# Patient Record
Sex: FEMALE | Race: WHITE | HISPANIC OR LATINO | Employment: OTHER | URBAN - METROPOLITAN AREA
[De-identification: names, ages, dates, MRNs, and addresses within clinical notes are randomized per-mention and may not be internally consistent; named-entity substitution may affect disease eponyms.]

---

## 2021-01-21 ENCOUNTER — TELEPHONE (OUTPATIENT)
Dept: SCHEDULING | Facility: IMAGING CENTER | Age: 66
End: 2021-01-21

## 2021-01-29 ENCOUNTER — TELEMEDICINE (OUTPATIENT)
Dept: MEDICAL GROUP | Facility: IMAGING CENTER | Age: 66
End: 2021-01-29
Payer: COMMERCIAL

## 2021-01-29 VITALS
DIASTOLIC BLOOD PRESSURE: 91 MMHG | SYSTOLIC BLOOD PRESSURE: 149 MMHG | BODY MASS INDEX: 19.29 KG/M2 | WEIGHT: 113 LBS | TEMPERATURE: 99.4 F | OXYGEN SATURATION: 91 % | HEIGHT: 64 IN | HEART RATE: 96 BPM

## 2021-01-29 DIAGNOSIS — Z12.11 SCREEN FOR COLON CANCER: ICD-10-CM

## 2021-01-29 DIAGNOSIS — K64.4 EXTERNAL HEMORRHOID: ICD-10-CM

## 2021-01-29 DIAGNOSIS — I10 ESSENTIAL HYPERTENSION: ICD-10-CM

## 2021-01-29 DIAGNOSIS — R06.02 SOB (SHORTNESS OF BREATH) ON EXERTION: ICD-10-CM

## 2021-01-29 DIAGNOSIS — G47.33 OBSTRUCTIVE SLEEP APNEA SYNDROME: ICD-10-CM

## 2021-01-29 DIAGNOSIS — M81.0 OSTEOPOROSIS OF LUMBAR SPINE: ICD-10-CM

## 2021-01-29 DIAGNOSIS — K21.9 GASTROESOPHAGEAL REFLUX DISEASE, UNSPECIFIED WHETHER ESOPHAGITIS PRESENT: ICD-10-CM

## 2021-01-29 DIAGNOSIS — Z86.010 HISTORY OF COLON POLYPS: ICD-10-CM

## 2021-01-29 DIAGNOSIS — Z13.220 SCREENING, LIPID: ICD-10-CM

## 2021-01-29 DIAGNOSIS — Z12.31 ENCOUNTER FOR SCREENING MAMMOGRAM FOR MALIGNANT NEOPLASM OF BREAST: ICD-10-CM

## 2021-01-29 DIAGNOSIS — Z11.59 NEED FOR HEPATITIS C SCREENING TEST: ICD-10-CM

## 2021-01-29 PROBLEM — Z86.0100 HISTORY OF COLON POLYPS: Status: ACTIVE | Noted: 2021-01-29

## 2021-01-29 PROCEDURE — 99204 OFFICE O/P NEW MOD 45 MIN: CPT | Mod: 95,CR | Performed by: FAMILY MEDICINE

## 2021-01-29 RX ORDER — LISINOPRIL 10 MG/1
10 TABLET ORAL DAILY
Qty: 90 TAB | Refills: 0 | Status: SHIPPED | OUTPATIENT
Start: 2021-01-29 | End: 2021-04-22 | Stop reason: SDUPTHER

## 2021-01-29 RX ORDER — CHLORAL HYDRATE 500 MG
1000 CAPSULE ORAL 2 TIMES DAILY
COMMUNITY

## 2021-01-29 RX ORDER — LISINOPRIL 10 MG/1
10 TABLET ORAL DAILY
COMMUNITY
End: 2021-01-29 | Stop reason: SDUPTHER

## 2021-01-29 ASSESSMENT — PAIN SCALES - GENERAL: PAINLEVEL: NO PAIN

## 2021-01-29 ASSESSMENT — PATIENT HEALTH QUESTIONNAIRE - PHQ9: CLINICAL INTERPRETATION OF PHQ2 SCORE: 0

## 2021-01-29 NOTE — PATIENT INSTRUCTIONS
Trent Hollis, nice to meet you today!  Please plan to fast 8-10 hours for your lab work. Thank you!

## 2021-01-29 NOTE — PROGRESS NOTES
Telemedicine: New Patient   This evaluation was conducted via Zoom using secure and encrypted videoconferencing technology. The patient was in a private location in the state of [place].    The patient's identity was confirmed and verbal consent was obtained for this virtual visit.    Subjective:     CC:   Chief Complaint   Patient presents with   • Hypertension Follow-up     Rx refill   • Referral Needed     due for colonoscopy, issues w/ digestion   • Establish Care     [facility] womens well exam   • Shortness of Breath     has sleep apnea and cpap machine       Telma Vergara is a 65 y.o. female new patient presenting to establish care and to discuss the evaluation and management of:  humana- change in PCP.     Hypertension- does not us machine all the time 130-140s systolic.   Lisinopril 10 mg, since 2017. Tolerated fine. No cough symptoms.   Last lab work July 2020, she will send.     Hx of colon polyp. 2012- last one completed per patient.  States she is due. Moved from [place] to here- since 2017, this has not completed in between.  Some reflux with certain foods.     Sleep apnea, obstructive-on cpap since 2013.   No pulmonary specialist she is seen currently.  2017-last had checked.  Moved to [facility] in 2017 to a higher elevation and has since adjusted.  Sleeps overall well.  No issues currently with her machine.    Occasional with exercising, notices a little shortness of breath. Last month or so with symptoms more so.   Different than needing to take a deep breath.   Never smoker, no second hand smoke exposure.  No wheezing. Sometimes with lying down.   No allergies. Sometimes reflux with certain foods.   No chest pain or pressure.  No cough symptoms.  Sometimes with more exertional exercise, occurs occasionally but not consistently.  O2 sat- usually 88-89%, at rest, as reviewed with prior PCP.  Last 2 years has noticed changes.  States she is not concerned about this but her  had noted that this is not a  normal range.  States his oxygen levels are better.      Healthcare maintenance:  Pap: Will schedule gyn exam in near future at our office.   Feb 2020- negative pap. Prior pap with + hpv.   Colonoscopy-believes last one was 2012.  History of colon polyps.  States she is due.    Dexa- 2015, prior to it on medication for bone loss. 5 years. Possibly improved.    Immunizations: We will need from prior records.  Reviewed recommendations for shingles vaccine and pneumonia vaccine.      Health Maintenance Summary                HEPATITIS C SCREENING Overdue 1955     PAP SMEAR Overdue 12/28/1976     MAMMOGRAM Overdue 12/28/1995     COLONOSCOPY Overdue 12/28/2005     IMM ZOSTER VACCINES Overdue 12/28/2005     BONE DENSITY Overdue 12/28/2020     IMM PNEUMOCOCCAL VACCINE: 65+ Years Overdue 12/28/2020     IMM DTaP/Tdap/Td Vaccine Next Due 2/22/2030      Done 2/22/2020 Ext Imm: Tdap        There is no immunization history on file for this patient.    Lifestyle:  Diet: cooks all meals. Not a lot of red meat. Mostly fish, vegetables, overall healthy. Yogurt. 5 small meal.   Exercise daily.   Stress: mostly work related, exercise.   Lives with her -has adult children.  Work: from home, . Has grown.       ROS  Review of Systems   Constitutional: Negative for fever, chills and malaise/fatigue.   HENT: Negative for congestion.    Eyes: Negative for pain or visual.   Respiratory: Negative for cough.   Cardiovascular: Negative for leg swelling.   Gastrointestinal: Negative for nausea, vomiting, abdominal pain and diarrhea.   Genitourinary: Negative for dysuria and hematuria.   Skin: Negative for rash.   Neurological: Negative for dizziness, focal weakness and headaches.   Endo/Heme/Allergies: Does not bruise/bleed easily.   Psychiatric/Behavioral: Negative for depression.  The patient is not nervous/anxious.        No Known Allergies    Current medicines (including changes today)  Current Outpatient  Medications   Medication Sig Dispense Refill   • VITAMIN D PO Take  by mouth.     • Omega-3 Fatty Acids (FISH OIL) 1000 MG Cap capsule Take 1,000 mg by mouth 3 times a day, with meals.     • Probiotic Product (PROBIOTIC-10 PO) Take  by mouth.     • lisinopril (PRINIVIL) 10 MG Tab Take 1 Tab by mouth every day. 90 Tab 0     No current facility-administered medications for this visit.        She  has a past medical history of Hypertension and Sleep apnea (01/01/2012).  She  has a past surgical history that includes cholecystectomy (2013); bunionectomy (Right, 2015); eye surgery (2016); lumpectomy (2004,2014); and primary c section.      Family History   Problem Relation Age of Onset   • Heart Disease Mother    • COPD Father    • Other Father    • Kidney Disease Father      Family Status   Relation Name Status   • Mo  (Not Specified)   • Fa  (Not Specified)     Social History     Socioeconomic History   • Marital status:      Spouse name: Not on file   • Number of children: Not on file   • Years of education: Not on file   • Highest education level: Not on file   Occupational History   • Not on file   Social Needs   • Financial resource strain: Not on file   • Food insecurity     Worry: Not on file     Inability: Not on file   • Transportation needs     Medical: Not on file     Non-medical: Not on file   Tobacco Use   • Smoking status: Never Smoker   • Smokeless tobacco: Never Used   Substance and Sexual Activity   • Alcohol use: Yes   • Drug use: Never   • Sexual activity: Yes     Partners: Male   Lifestyle   • Physical activity     Days per week: Not on file     Minutes per session: Not on file   • Stress: Not on file   Relationships   • Social connections     Talks on phone: Not on file     Gets together: Not on file     Attends Gnosticism service: Not on file     Active member of club or organization: Not on file     Attends meetings of clubs or organizations: Not on file     Relationship status: Not on file  "  • Intimate partner violence     Fear of current or ex partner: Not on file     Emotionally abused: Not on file     Physically abused: Not on file     Forced sexual activity: Not on file   Other Topics Concern   • Not on file   Social History Narrative   • Not on file         Patient Active Problem List    Diagnosis Date Noted   • Essential hypertension 01/29/2021   • Obstructive sleep apnea syndrome 01/29/2021   • History of colon polyps 01/29/2021   • External hemorrhoid 01/29/2021   • Osteoporosis of lumbar spine 01/29/2021   • SOB (shortness of breath) on exertion 01/29/2021          Objective:   /91 (BP Location: Left arm, Patient Position: Sitting, BP Cuff Size: Other (Comment)) Comment (BP Cuff Size): BP machine  Pulse 96   Temp 37.4 °C (99.4 °F) (Tympanic)   Ht 1.626 m (5' 4\")   Wt 51.3 kg (113 lb)   SpO2 91% Comment: pt says it has dropped to 88-89 before  BMI 19.40 kg/m²     Physical Exam  Constitutional: Alert, no distress, well-groomed.  Skin: No rashes in visible areas.  Eye: Round. Conjunctiva clear, lids normal. No icterus.   ENMT: Lips pink without lesions, good dentition, moist mucous membranes. Phonation normal.  Neck: No masses, no thyromegaly. Moves freely without pain.  CV: Pulse as reported by patient  Respiratory: Unlabored respiratory effort, no cough or audible wheeze  Psych: Alert and oriented x3, normal affect and mood.       Assessment and Plan:   The following treatment plan was discussed:       65-year-old female new patient.     1. Essential hypertension  lisinopril (PRINIVIL) 10 MG Tab    Comp Metabolic Panel    CBC WITH DIFFERENTIAL   2. Screen for colon cancer     3. History of colon polyps  REFERRAL TO GI FOR COLONOSCOPY   4. External hemorrhoid  REFERRAL TO GI FOR COLONOSCOPY   5. Obstructive sleep apnea syndrome     6. Gastroesophageal reflux disease, unspecified whether esophagitis present     7. Osteoporosis of lumbar spine  VITAMIN D,25 HYDROXY    TSH WITH REFLEX " TO FT4    DS-BONE DENSITY STUDY (DEXA)   8. SOB (shortness of breath) on exertion  DX-CHEST-2 VIEWS    proBrain Natriuretic Peptide, NT   9. Screening, lipid  Lipid Profile   10. Need for hepatitis C screening test  HEP C VIRUS ANTIBODY   11. Encounter for screening mammogram for malignant neoplasm of breast  MA-SCREENING MAMMO BILAT W/TOMOSYNTHESIS W/CAD   -Hypertension-blood pressure slightly elevated today.  Patient does not consistently check her blood pressures currently.  Advised to continue current medication lisinopril 10 mg daily and check blood pressure routinely.  Recommend heart healthy diet and routine exercise.  We will continue to monitor at this time.  Medication refilled.  -History of colon polyps-patient is due for screening colonoscopy.   -External hemorrhoids-overall stable.  Recommend adequate fiber and fluid intake.  Monitor.   -BERNADETTE- stable on CPAP. Discussed reassessment of CPAP in future.   -Reflux-overall stable.  Occurs intermittently with certain types of foods.  Modify diet.  Monitor.  OTC medication as needed.  -Osteoporosis of spine-has been on medications in the past for about 5 years.  Noted some improvement but has been a while since last DEXA.  Obtain DEXA.  Recommend routine weight bearing exercise, adequate calcium and vitamin D intake.   -Shortness of breath on exertion-appears more so in the past month but notes some slightly lower pulse oximetry levels in the 88 to 89% range intermittently for past couple years.  Non-smoker, no significant prior exposure to secondhand smoke.  Unclear etiology at this time.  Recommend further evaluation with lab work and chest x-ray.  Discussed obtaining EKG at future visit as today is virtual. ER precautions for any worsening symptoms.     Follow-up:  Patient to schedule for gynecological visit in near future.            This medical record contains text that has been entered with the assistance of computer voice recognition and dictation  software.  Therefore, it may contain unintended errors in text, spelling, punctuation, or grammar.

## 2021-02-24 SDOH — ECONOMIC STABILITY: HOUSING INSECURITY: IN THE LAST 12 MONTHS, HOW MANY PLACES HAVE YOU LIVED?: 1

## 2021-02-24 SDOH — ECONOMIC STABILITY: HOUSING INSECURITY
IN THE LAST 12 MONTHS, WAS THERE A TIME WHEN YOU DID NOT HAVE A STEADY PLACE TO SLEEP OR SLEPT IN A SHELTER (INCLUDING NOW)?: NO

## 2021-02-24 SDOH — ECONOMIC STABILITY: INCOME INSECURITY: IN THE LAST 12 MONTHS, WAS THERE A TIME WHEN YOU WERE NOT ABLE TO PAY THE MORTGAGE OR RENT ON TIME?: NO

## 2021-02-24 SDOH — HEALTH STABILITY: MENTAL HEALTH
STRESS IS WHEN SOMEONE FEELS TENSE, NERVOUS, ANXIOUS, OR CAN'T SLEEP AT NIGHT BECAUSE THEIR MIND IS TROUBLED. HOW STRESSED ARE YOU?: TO SOME EXTENT

## 2021-02-24 SDOH — ECONOMIC STABILITY: TRANSPORTATION INSECURITY
IN THE PAST 12 MONTHS, HAS LACK OF RELIABLE TRANSPORTATION KEPT YOU FROM MEDICAL APPOINTMENTS, MEETINGS, WORK OR FROM GETTING THINGS NEEDED FOR DAILY LIVING?: NO

## 2021-02-24 SDOH — HEALTH STABILITY: PHYSICAL HEALTH: ON AVERAGE, HOW MANY MINUTES DO YOU ENGAGE IN EXERCISE AT THIS LEVEL?: 80 MINUTES

## 2021-02-24 SDOH — HEALTH STABILITY: PHYSICAL HEALTH: ON AVERAGE, HOW MANY DAYS PER WEEK DO YOU ENGAGE IN MODERATE TO STRENUOUS EXERCISE (LIKE A BRISK WALK)?: 7 DAYS

## 2021-02-24 SDOH — ECONOMIC STABILITY: TRANSPORTATION INSECURITY
IN THE PAST 12 MONTHS, HAS THE LACK OF TRANSPORTATION KEPT YOU FROM MEDICAL APPOINTMENTS OR FROM GETTING MEDICATIONS?: NO

## 2021-02-24 ASSESSMENT — SOCIAL DETERMINANTS OF HEALTH (SDOH)
IN A TYPICAL WEEK, HOW MANY TIMES DO YOU TALK ON THE PHONE WITH FAMILY, FRIENDS, OR NEIGHBORS?: THREE TIMES A WEEK
HOW OFTEN DO YOU HAVE A DRINK CONTAINING ALCOHOL: 4 OR MORE TIMES A WEEK
WITHIN THE PAST 12 MONTHS, YOU WORRIED THAT YOUR FOOD WOULD RUN OUT BEFORE YOU GOT THE MONEY TO BUY MORE: NEVER TRUE
HOW HARD IS IT FOR YOU TO PAY FOR THE VERY BASICS LIKE FOOD, HOUSING, MEDICAL CARE, AND HEATING?: NOT HARD AT ALL
HOW OFTEN DO YOU ATTENT MEETINGS OF THE CLUB OR ORGANIZATION YOU BELONG TO?: NEVER
HOW OFTEN DO YOU HAVE SIX OR MORE DRINKS ON ONE OCCASION: NEVER
DO YOU BELONG TO ANY CLUBS OR ORGANIZATIONS SUCH AS CHURCH GROUPS UNIONS, FRATERNAL OR ATHLETIC GROUPS, OR SCHOOL GROUPS?: NO
HOW OFTEN DO YOU ATTEND CHURCH OR RELIGIOUS SERVICES?: NEVER
HOW MANY DRINKS CONTAINING ALCOHOL DO YOU HAVE ON A TYPICAL DAY WHEN YOU ARE DRINKING: 1 OR 2
WITHIN THE PAST 12 MONTHS, THE FOOD YOU BOUGHT JUST DIDN'T LAST AND YOU DIDN'T HAVE MONEY TO GET MORE: NEVER TRUE
HOW OFTEN DO YOU GET TOGETHER WITH FRIENDS OR RELATIVES?: DECLINE

## 2021-02-25 ENCOUNTER — HOSPITAL ENCOUNTER (OUTPATIENT)
Facility: MEDICAL CENTER | Age: 66
End: 2021-02-25
Attending: FAMILY MEDICINE
Payer: COMMERCIAL

## 2021-02-25 ENCOUNTER — TELEPHONE (OUTPATIENT)
Dept: MEDICAL GROUP | Facility: IMAGING CENTER | Age: 66
End: 2021-02-25

## 2021-02-25 ENCOUNTER — OFFICE VISIT (OUTPATIENT)
Dept: MEDICAL GROUP | Facility: IMAGING CENTER | Age: 66
End: 2021-02-25
Payer: COMMERCIAL

## 2021-02-25 ENCOUNTER — HOSPITAL ENCOUNTER (OUTPATIENT)
Dept: LAB | Facility: MEDICAL CENTER | Age: 66
End: 2021-02-25
Attending: FAMILY MEDICINE
Payer: COMMERCIAL

## 2021-02-25 VITALS
HEART RATE: 70 BPM | DIASTOLIC BLOOD PRESSURE: 70 MMHG | OXYGEN SATURATION: 96 % | SYSTOLIC BLOOD PRESSURE: 138 MMHG | HEIGHT: 64 IN | RESPIRATION RATE: 12 BRPM | TEMPERATURE: 98.3 F | WEIGHT: 117 LBS | BODY MASS INDEX: 19.97 KG/M2

## 2021-02-25 DIAGNOSIS — R06.02 SOB (SHORTNESS OF BREATH) ON EXERTION: ICD-10-CM

## 2021-02-25 DIAGNOSIS — G47.33 OBSTRUCTIVE SLEEP APNEA SYNDROME: ICD-10-CM

## 2021-02-25 DIAGNOSIS — Z11.59 NEED FOR HEPATITIS C SCREENING TEST: ICD-10-CM

## 2021-02-25 DIAGNOSIS — N94.9 ADNEXAL FULLNESS: ICD-10-CM

## 2021-02-25 DIAGNOSIS — I10 ESSENTIAL HYPERTENSION: ICD-10-CM

## 2021-02-25 DIAGNOSIS — Z86.010 HISTORY OF COLON POLYPS: ICD-10-CM

## 2021-02-25 DIAGNOSIS — M19.041 PRIMARY OSTEOARTHRITIS OF RIGHT HAND: ICD-10-CM

## 2021-02-25 DIAGNOSIS — L82.1 SK (SEBORRHEIC KERATOSIS): ICD-10-CM

## 2021-02-25 DIAGNOSIS — M81.0 OSTEOPOROSIS OF LUMBAR SPINE: ICD-10-CM

## 2021-02-25 DIAGNOSIS — Z13.220 SCREENING, LIPID: ICD-10-CM

## 2021-02-25 DIAGNOSIS — Z01.419 WELL WOMAN EXAM WITH ROUTINE GYNECOLOGICAL EXAM: ICD-10-CM

## 2021-02-25 DIAGNOSIS — R89.9 ABNORMAL LABORATORY TEST RESULT: ICD-10-CM

## 2021-02-25 DIAGNOSIS — M89.8X9 BONY PROMINENCE: ICD-10-CM

## 2021-02-25 DIAGNOSIS — Z12.4 SCREENING FOR CERVICAL CANCER: ICD-10-CM

## 2021-02-25 DIAGNOSIS — R19.8 GI PROBLEM: ICD-10-CM

## 2021-02-25 LAB
25(OH)D3 SERPL-MCNC: 70 NG/ML (ref 30–100)
ALBUMIN SERPL BCP-MCNC: 4.3 G/DL (ref 3.2–4.9)
ALBUMIN/GLOB SERPL: 1.6 G/DL
ALP SERPL-CCNC: 94 U/L (ref 30–99)
ALT SERPL-CCNC: 18 U/L (ref 2–50)
ANION GAP SERPL CALC-SCNC: 8 MMOL/L (ref 7–16)
AST SERPL-CCNC: 20 U/L (ref 12–45)
BASOPHILS # BLD AUTO: 0.5 % (ref 0–1.8)
BASOPHILS # BLD: 0.02 K/UL (ref 0–0.12)
BILIRUB SERPL-MCNC: 0.6 MG/DL (ref 0.1–1.5)
BUN SERPL-MCNC: 17 MG/DL (ref 8–22)
CALCIUM SERPL-MCNC: 9.6 MG/DL (ref 8.5–10.5)
CHLORIDE SERPL-SCNC: 110 MMOL/L (ref 96–112)
CHOLEST SERPL-MCNC: 234 MG/DL (ref 100–199)
CO2 SERPL-SCNC: 28 MMOL/L (ref 20–33)
CREAT SERPL-MCNC: 0.72 MG/DL (ref 0.5–1.4)
EOSINOPHIL # BLD AUTO: 0.1 K/UL (ref 0–0.51)
EOSINOPHIL NFR BLD: 2.6 % (ref 0–6.9)
ERYTHROCYTE [DISTWIDTH] IN BLOOD BY AUTOMATED COUNT: 45.6 FL (ref 35.9–50)
FASTING STATUS PATIENT QL REPORTED: NORMAL
GLOBULIN SER CALC-MCNC: 2.7 G/DL (ref 1.9–3.5)
GLUCOSE SERPL-MCNC: 92 MG/DL (ref 65–99)
HCT VFR BLD AUTO: 42.3 % (ref 37–47)
HCV AB SER QL: NORMAL
HDLC SERPL-MCNC: 125 MG/DL
HGB BLD-MCNC: 14.1 G/DL (ref 12–16)
IMM GRANULOCYTES # BLD AUTO: 0.01 K/UL (ref 0–0.11)
IMM GRANULOCYTES NFR BLD AUTO: 0.3 % (ref 0–0.9)
LDLC SERPL CALC-MCNC: 101 MG/DL
LYMPHOCYTES # BLD AUTO: 1.34 K/UL (ref 1–4.8)
LYMPHOCYTES NFR BLD: 34.6 % (ref 22–41)
MCH RBC QN AUTO: 30.1 PG (ref 27–33)
MCHC RBC AUTO-ENTMCNC: 33.3 G/DL (ref 33.6–35)
MCV RBC AUTO: 90.4 FL (ref 81.4–97.8)
MONOCYTES # BLD AUTO: 0.28 K/UL (ref 0–0.85)
MONOCYTES NFR BLD AUTO: 7.2 % (ref 0–13.4)
NEUTROPHILS # BLD AUTO: 2.12 K/UL (ref 2–7.15)
NEUTROPHILS NFR BLD: 54.8 % (ref 44–72)
NRBC # BLD AUTO: 0 K/UL
NRBC BLD-RTO: 0 /100 WBC
NT-PROBNP SERPL IA-MCNC: 31 PG/ML (ref 0–125)
PLATELET # BLD AUTO: 226 K/UL (ref 164–446)
PMV BLD AUTO: 11.5 FL (ref 9–12.9)
POTASSIUM SERPL-SCNC: 4.9 MMOL/L (ref 3.6–5.5)
PROT SERPL-MCNC: 7 G/DL (ref 6–8.2)
RBC # BLD AUTO: 4.68 M/UL (ref 4.2–5.4)
SODIUM SERPL-SCNC: 146 MMOL/L (ref 135–145)
TRIGL SERPL-MCNC: 40 MG/DL (ref 0–149)
TSH SERPL DL<=0.005 MIU/L-ACNC: 1.94 UIU/ML (ref 0.38–5.33)
VIT B12 SERPL-MCNC: 521 PG/ML (ref 211–911)
WBC # BLD AUTO: 3.9 K/UL (ref 4.8–10.8)

## 2021-02-25 PROCEDURE — 85025 COMPLETE CBC W/AUTO DIFF WBC: CPT

## 2021-02-25 PROCEDURE — 83880 ASSAY OF NATRIURETIC PEPTIDE: CPT

## 2021-02-25 PROCEDURE — 80053 COMPREHEN METABOLIC PANEL: CPT

## 2021-02-25 PROCEDURE — 88175 CYTOPATH C/V AUTO FLUID REDO: CPT

## 2021-02-25 PROCEDURE — 82607 VITAMIN B-12: CPT

## 2021-02-25 PROCEDURE — 82306 VITAMIN D 25 HYDROXY: CPT

## 2021-02-25 PROCEDURE — 87624 HPV HI-RISK TYP POOLED RSLT: CPT

## 2021-02-25 PROCEDURE — 80061 LIPID PANEL: CPT

## 2021-02-25 PROCEDURE — 84443 ASSAY THYROID STIM HORMONE: CPT

## 2021-02-25 PROCEDURE — 36415 COLL VENOUS BLD VENIPUNCTURE: CPT

## 2021-02-25 PROCEDURE — 99397 PER PM REEVAL EST PAT 65+ YR: CPT | Performed by: FAMILY MEDICINE

## 2021-02-25 PROCEDURE — 86803 HEPATITIS C AB TEST: CPT

## 2021-02-25 ASSESSMENT — PAIN SCALES - GENERAL: PAINLEVEL: NO PAIN

## 2021-02-25 NOTE — PROGRESS NOTES
Chief Complaint   Patient presents with   • Gynecologic Exam         <SUBJECTIVE>  Telma Vergara is a 65 y.o. female for routine annual evaluation.     No LMP recorded. Patient is postmenopausal.   No abnormal bleeding or discharge.  Patient is sexually active.  Denies any dyspareunia.  Notes some vaginal dryness but has been able to manage and work around this.     Hypertension- at home  127/78. Lisinopril 10 mg.  Tolerates medication well.  Denies any cough symptoms. 134 in afternoon systolic.     Sleep apnea-stable on CPAP.    Note she had a microbiome testing done which showed some low B12 levels.  States she may have some GI bloating.    Incidental finding of prominence of chest wall area anteriorly of second rib area.  Patient notes that she works out regularly and was not sure if it was due to that.  No pain of area.  No unusual back pain symptoms currently.    Has had a couple lesions on her back on the right side which she scraped off previously.  Can be itchy of the area.    Asking about joint thickening of right distal DIP joint of pinky.  States she does a lot of typing.    Occasional shortness of breath with exercise discussed previously. She is scheduled to have her chest x-ray scheduled for next week. Stable otherwise.     Osteoporosis-DEXA scan scheduled for next week.  Colonoscopy is scheduled for future.  History of colon polyp.  Patient completed lab work today-awaiting results.    Health Maintenance:  Last pap: Due  Diet: healthy. One glass a night which is more regular than her usual. Plans to cut down to weekends.   Exercise: regularly  Immunizations: Patient will wait on shingles vaccine and pneumonia vaccine as she is planning to complete her Covid-19 vaccination.  Hep C screening: Awaiting results.  Mammogram: Scheduled for future.  Colonoscopy: Scheduled for future.  Dexa Scan: Scheduled for future.    Health Maintenance Summary                MAMMOGRAM Overdue 12/28/1995     COLONOSCOPY  Overdue 2005     IMM ZOSTER VACCINES Overdue 2005     IMM PNEUMOCOCCAL VACCINE: 65+ Years Overdue 2020     PAP SMEAR Next Due 2024      Done 2021 PATHOLOGY GYNECOLOGY SPECIMEN     Patient has more history with this topic...    BONE DENSITY Next Due 3/2/2026      Done 3/2/2021 DS-BONE DENSITY STUDY (DEXA)    IMM DTaP/Tdap/Td Vaccine Next Due 2030      Done 2020 Ext Imm: Tdap            Current Outpatient Medications   Medication Sig Dispense Refill   • VITAMIN D PO Take  by mouth.     • Omega-3 Fatty Acids (FISH OIL) 1000 MG Cap capsule Take 1,000 mg by mouth 2 Times a Day. After meals     • Probiotic Product (PROBIOTIC-10 PO) Take  by mouth.     • lisinopril (PRINIVIL) 10 MG Tab Take 1 Tab by mouth every day. 90 Tab 0     No current facility-administered medications for this visit.     Drug allergies: Patient has no known allergies.    OB History    Para Term  AB Living   4 2 0 0 2 0   SAB TAB Ectopic Molar Multiple Live Births   0 0 0 0 0 0       Past Medical History:   Diagnosis Date   • Hypertension    • Sleep apnea 2012       Past Surgical History:   Procedure Laterality Date   • EYE SURGERY  2016    eye lens implants   • BUNIONECTOMY Right 2015   • CHOLECYSTECTOMY     • LUMPECTOMY  ,    breast implants and revision   • PRIMARY C SECTION             Family History   Problem Relation Age of Onset   • Heart Disease Mother    • COPD Father    • Other Father    • Kidney Disease Father        Social History     Socioeconomic History   • Marital status:      Spouse name: Not on file   • Number of children: Not on file   • Years of education: Not on file   • Highest education level: Some college, no degree   Occupational History   • Not on file   Tobacco Use   • Smoking status: Never Smoker   • Smokeless tobacco: Never Used   Substance and Sexual Activity   • Alcohol use: Yes     Alcohol/week: 0.6 oz     Types: 1 Glasses of wine per week  "  • Drug use: Never   • Sexual activity: Yes     Partners: Male   Other Topics Concern   • Not on file   Social History Narrative   • Not on file     Social Determinants of Health     Financial Resource Strain: Low Risk    • Difficulty of Paying Living Expenses: Not hard at all   Food Insecurity: No Food Insecurity   • Worried About Running Out of Food in the Last Year: Never true   • Ran Out of Food in the Last Year: Never true   Transportation Needs: No Transportation Needs   • Lack of Transportation (Medical): No   • Lack of Transportation (Non-Medical): No   Physical Activity: Sufficiently Active   • Days of Exercise per Week: 7 days   • Minutes of Exercise per Session: 80 min   Stress: Stress Concern Present   • Feeling of Stress : To some extent   Social Connections: Somewhat Isolated   • Frequency of Communication with Friends and Family: Three times a week   • Frequency of Social Gatherings with Friends and Family: Patient refused   • Attends Orthodoxy Services: Never   • Active Member of Clubs or Organizations: No   • Attends Club or Organization Meetings: Never   • Marital Status:    Intimate Partner Violence:    • Fear of Current or Ex-Partner:    • Emotionally Abused:    • Physically Abused:    • Sexually Abused:        ROS:  No fevers/chills. No TIA's or unusual headaches, no dysphagia. No prolonged cough. No chest pain.  No abdominal pain, change in bowel habits, black or bloody stools.  No urinary tract symptoms. On self exam, has noted no new or enlarging breast lumps, nipple discharge or breast pain. Gyn: No abnormal discharge or bleeding.       <OBJECTIVE>  /70 (BP Location: Left arm, Patient Position: Sitting, BP Cuff Size: Adult)   Pulse 70   Temp 36.8 °C (98.3 °F) (Temporal)   Resp 12   Ht 1.626 m (5' 4\")   Wt 53.1 kg (117 lb)   SpO2 96%   BMI 20.08 kg/m²   HEAD AND NECK:  Ears normal.  Patient wearing mask.   Neck supple. No adenopathy or masses in the neck or " supraclavicular regions.  No carotid bruits. No thyromegaly.  NEURO: Cranial nerves are normal in visible areas. Neck supple. DTR's normal and symmetric.  CHEST:  Clear, good air entry, no wheezes, rhonchi or rales.  HEART:  S1 and S2 normal, no murmurs, clicks, gallops or rubs. Regular rate and rhythm.  No edema.  Chest wall: Slight bony prominence/protrusion of anterior chest region at second rib right greater than left, area is nontender to palpation.  ABDOMEN:  Soft without tenderness, guarding, mass or organomegaly. No CVA tenderness or inguinal adenopathy.  EXTREMITIES:  Extremities, reflexes and peripheral pulses are normal.  Slight thickening of joint of right DIP joint, no erythema or edema noted, adequate ROM.  SKIN:  No rashes or suspicious skin lesions noted.  Right side upper back region with 2 light brown slightly elevated skin lesions with stuck on appearance.  BREAST EXAM: No healed surgical scarring around areolar region.  Palpable breast implants bilaterally.  Inspection otherwise negative. No nipple discharge or bleeding. No masses or nodularity palpable. No axillary INEZ.   PELVIC EXAM: External genitalia and vagina normal.  Vaginal atrophy noted.  Normal appearing cervix although atrophy noted, appears slight cervical stenosis. No abnormal discharge. No cervical motion tenderness.  Bimanual exam with slight adnexal fullness on the right side, palpable lymph nodes bilaterally inguinal region which appear to be of normal size and do not appear enlarged, nontender to palpation.    Rectal Exam: normal sphincter tone, no masses. Stool guaiac negative.       ASSESSMENT/PLAN:    65-year-old female here for routine well woman and gynecological exam.    1. Well woman exam with routine gynecological exam     2. Screening for cervical cancer  THINPREP PAP WITH HPV   3. Adnexal fullness  US-PELVIC COMPLETE (TRANSABDOMINAL/TRANSVAGINAL) (COMBO)   4. Essential hypertension     5. Obstructive sleep apnea  syndrome     6. GI problem  VITAMIN B12   7. Abnormal laboratory test result  VITAMIN B12   8. Bony prominence     9. SK (seborrheic keratosis)     10. Primary osteoarthritis of right hand     11. Osteoporosis of lumbar spine     12. History of colon polyps     13. SOB (shortness of breath) on exertion     -Recommend healthy diet and routine exercise.  Screening Pap smear completed today.  -Slight adnexal fullness on exam.  Will evaluate further with pelvic ultrasound.  -Hypertension-blood pressure overall stable.  Continue current medication.  Heart healthy diet routine exercise.  -BERNADETTE on CPAP.  Stable, continue current therapy.  -GI problem-some bloating intermittently.  Had some lab work with low B12 noted.  Will obtain B12 levels from recent lab work.  -Bony prominence/protrusion of 2nd rib, right greater than left.  Unclear etiology.  Consider associated with spinal etiology.   Await chest x-ray results.  -Seborrheic keratoses-right upper back x2.  Appears benign.  Monitor.  Follow-up as needed.  May consider routine skin check with dermatology.  -Osteoarthritis of right hand-discussed management with routine stretching and strengthening exercises.  Follow-up and imaging as needed.  -Osteoporosis-patient is scheduled for repeat DEXA.    Recommend routine weightbearing exercise, adequate calcium and vitamin D intake.  -History of colon polyps-patient is scheduled for future colonoscopy.  -Shortness of breath on exertion on occasion, mild.  Vitals stable.    Await chest x-ray results.      Patient to follow-up after lab work is resulted and imaging complete.    This medical record contains text that has been entered with the assistance of computer voice recognition and dictation software.  Therefore, it may contain unintended errors in text, spelling, punctuation, or grammar.

## 2021-02-25 NOTE — TELEPHONE ENCOUNTER
Contacted [facility] at [phone]. Informed lab that pt was there this morning and had labs drawn. However, the provider is now adding a new lab order for Vitamin B12.     Did not get the name of the person (female) answering the call at [facility], but she looked up pt's account and was able to confirm the patient did have labs drawn this morning and states will be able to add the Vitamin B12 lab too.

## 2021-02-26 LAB
CYTOLOGY REG CYTOL: NORMAL
HPV HR 12 DNA CVX QL NAA+PROBE: NEGATIVE
HPV16 DNA SPEC QL NAA+PROBE: NEGATIVE
HPV18 DNA SPEC QL NAA+PROBE: NEGATIVE
SPECIMEN SOURCE: NORMAL

## 2021-03-01 DIAGNOSIS — D72.819 LEUKOPENIA, UNSPECIFIED TYPE: ICD-10-CM

## 2021-03-02 ENCOUNTER — HOSPITAL ENCOUNTER (OUTPATIENT)
Dept: RADIOLOGY | Facility: MEDICAL CENTER | Age: 66
End: 2021-03-02
Attending: FAMILY MEDICINE
Payer: COMMERCIAL

## 2021-03-02 DIAGNOSIS — R06.02 SOB (SHORTNESS OF BREATH) ON EXERTION: ICD-10-CM

## 2021-03-02 DIAGNOSIS — M81.0 OSTEOPOROSIS OF LUMBAR SPINE: ICD-10-CM

## 2021-03-02 PROCEDURE — 77080 DXA BONE DENSITY AXIAL: CPT

## 2021-03-02 PROCEDURE — 71046 X-RAY EXAM CHEST 2 VIEWS: CPT

## 2021-03-03 DIAGNOSIS — Z23 NEED FOR VACCINATION: ICD-10-CM

## 2021-03-08 DIAGNOSIS — R06.02 SOB (SHORTNESS OF BREATH) ON EXERTION: ICD-10-CM

## 2021-03-16 ENCOUNTER — APPOINTMENT (OUTPATIENT)
Dept: RADIOLOGY | Facility: MEDICAL CENTER | Age: 66
End: 2021-03-16
Attending: FAMILY MEDICINE

## 2021-03-22 ENCOUNTER — TELEPHONE (OUTPATIENT)
Dept: MEDICAL GROUP | Facility: IMAGING CENTER | Age: 66
End: 2021-03-22

## 2021-03-22 NOTE — TELEPHONE ENCOUNTER
Called pt and informed her that the lab orders (CBC with differential and Vitamin B12) have been sent to her as attachments via a [employer] message. Pt verbalized understanding.

## 2021-04-02 ENCOUNTER — HOSPITAL ENCOUNTER (OUTPATIENT)
Dept: RADIOLOGY | Facility: MEDICAL CENTER | Age: 66
End: 2021-04-02
Attending: FAMILY MEDICINE
Payer: COMMERCIAL

## 2021-04-02 DIAGNOSIS — N94.9 ADNEXAL FULLNESS: ICD-10-CM

## 2021-04-02 DIAGNOSIS — Z12.31 ENCOUNTER FOR SCREENING MAMMOGRAM FOR MALIGNANT NEOPLASM OF BREAST: ICD-10-CM

## 2021-04-02 PROCEDURE — 77063 BREAST TOMOSYNTHESIS BI: CPT

## 2021-04-02 PROCEDURE — 76830 TRANSVAGINAL US NON-OB: CPT

## 2021-04-09 ENCOUNTER — HOSPITAL ENCOUNTER (OUTPATIENT)
Dept: RADIOLOGY | Facility: MEDICAL CENTER | Age: 66
End: 2021-04-09
Payer: COMMERCIAL

## 2021-04-12 DIAGNOSIS — R93.89 ABNORMAL ULTRASOUND OF PELVIS: ICD-10-CM

## 2021-05-04 ENCOUNTER — HOSPITAL ENCOUNTER (OUTPATIENT)
Dept: CARDIOLOGY | Facility: MEDICAL CENTER | Age: 66
End: 2021-05-04
Attending: FAMILY MEDICINE
Payer: COMMERCIAL

## 2021-05-04 DIAGNOSIS — R06.02 SOB (SHORTNESS OF BREATH) ON EXERTION: ICD-10-CM

## 2021-05-04 LAB
LV EJECT FRACT  99904: 60
LV EJECT FRACT MOD 2C 99903: 58.61
LV EJECT FRACT MOD 4C 99902: 64.47
LV EJECT FRACT MOD BP 99901: 60.92

## 2021-05-04 PROCEDURE — 93306 TTE W/DOPPLER COMPLETE: CPT

## 2021-05-04 PROCEDURE — 93306 TTE W/DOPPLER COMPLETE: CPT | Mod: 26 | Performed by: INTERNAL MEDICINE

## 2021-06-08 ENCOUNTER — APPOINTMENT (OUTPATIENT)
Dept: RADIOLOGY | Facility: MEDICAL CENTER | Age: 66
End: 2021-06-08
Attending: FAMILY MEDICINE
Payer: COMMERCIAL

## 2021-06-23 ENCOUNTER — HOSPITAL ENCOUNTER (OUTPATIENT)
Dept: LAB | Facility: MEDICAL CENTER | Age: 66
End: 2021-06-23
Attending: FAMILY MEDICINE
Payer: COMMERCIAL

## 2021-06-23 DIAGNOSIS — D72.819 LEUKOPENIA, UNSPECIFIED TYPE: ICD-10-CM

## 2021-06-23 DIAGNOSIS — N94.9 ADNEXAL FULLNESS: ICD-10-CM

## 2021-06-23 DIAGNOSIS — R89.9 ABNORMAL LABORATORY TEST RESULT: ICD-10-CM

## 2021-06-23 DIAGNOSIS — R93.89 ABNORMAL FINDING ON IMAGING: ICD-10-CM

## 2021-06-23 DIAGNOSIS — R19.8 GI PROBLEM: ICD-10-CM

## 2021-06-23 LAB
ALBUMIN SERPL BCP-MCNC: 4.4 G/DL (ref 3.2–4.9)
ANION GAP SERPL CALC-SCNC: 11 MMOL/L (ref 7–16)
BASOPHILS # BLD AUTO: 1.1 % (ref 0–1.8)
BASOPHILS # BLD: 0.04 K/UL (ref 0–0.12)
BUN SERPL-MCNC: 11 MG/DL (ref 8–22)
CALCIUM SERPL-MCNC: 9 MG/DL (ref 8.5–10.5)
CHLORIDE SERPL-SCNC: 107 MMOL/L (ref 96–112)
CO2 SERPL-SCNC: 24 MMOL/L (ref 20–33)
CREAT SERPL-MCNC: 0.76 MG/DL (ref 0.5–1.4)
EOSINOPHIL # BLD AUTO: 0.07 K/UL (ref 0–0.51)
EOSINOPHIL NFR BLD: 2 % (ref 0–6.9)
ERYTHROCYTE [DISTWIDTH] IN BLOOD BY AUTOMATED COUNT: 45.1 FL (ref 35.9–50)
GLUCOSE SERPL-MCNC: 92 MG/DL (ref 65–99)
HCT VFR BLD AUTO: 40.8 % (ref 37–47)
HGB BLD-MCNC: 13.9 G/DL (ref 12–16)
IMM GRANULOCYTES # BLD AUTO: 0.01 K/UL (ref 0–0.11)
IMM GRANULOCYTES NFR BLD AUTO: 0.3 % (ref 0–0.9)
LYMPHOCYTES # BLD AUTO: 1.42 K/UL (ref 1–4.8)
LYMPHOCYTES NFR BLD: 40.7 % (ref 22–41)
MCH RBC QN AUTO: 30 PG (ref 27–33)
MCHC RBC AUTO-ENTMCNC: 34.1 G/DL (ref 33.6–35)
MCV RBC AUTO: 87.9 FL (ref 81.4–97.8)
MONOCYTES # BLD AUTO: 0.26 K/UL (ref 0–0.85)
MONOCYTES NFR BLD AUTO: 7.4 % (ref 0–13.4)
NEUTROPHILS # BLD AUTO: 1.69 K/UL (ref 2–7.15)
NEUTROPHILS NFR BLD: 48.5 % (ref 44–72)
NRBC # BLD AUTO: 0 K/UL
NRBC BLD-RTO: 0 /100 WBC
PHOSPHATE SERPL-MCNC: 3 MG/DL (ref 2.5–4.5)
PLATELET # BLD AUTO: 205 K/UL (ref 164–446)
PMV BLD AUTO: 10.8 FL (ref 9–12.9)
POTASSIUM SERPL-SCNC: 4 MMOL/L (ref 3.6–5.5)
RBC # BLD AUTO: 4.64 M/UL (ref 4.2–5.4)
SODIUM SERPL-SCNC: 142 MMOL/L (ref 135–145)
VIT B12 SERPL-MCNC: 560 PG/ML (ref 211–911)
WBC # BLD AUTO: 3.5 K/UL (ref 4.8–10.8)

## 2021-06-23 PROCEDURE — 80069 RENAL FUNCTION PANEL: CPT

## 2021-06-23 PROCEDURE — 82607 VITAMIN B-12: CPT

## 2021-06-23 PROCEDURE — 36415 COLL VENOUS BLD VENIPUNCTURE: CPT

## 2021-06-23 PROCEDURE — 85025 COMPLETE CBC W/AUTO DIFF WBC: CPT

## 2021-06-24 ENCOUNTER — HOSPITAL ENCOUNTER (OUTPATIENT)
Dept: RADIOLOGY | Facility: MEDICAL CENTER | Age: 66
End: 2021-06-24
Attending: FAMILY MEDICINE
Payer: COMMERCIAL

## 2021-06-24 DIAGNOSIS — N94.9 ADNEXAL FULLNESS: ICD-10-CM

## 2021-06-24 DIAGNOSIS — R93.89 ABNORMAL FINDING ON IMAGING: ICD-10-CM

## 2021-06-24 PROCEDURE — 700117 HCHG RX CONTRAST REV CODE 255: Performed by: FAMILY MEDICINE

## 2021-06-24 PROCEDURE — 74177 CT ABD & PELVIS W/CONTRAST: CPT

## 2021-06-24 RX ADMIN — IOHEXOL 100 ML: 350 INJECTION, SOLUTION INTRAVENOUS at 13:15

## 2021-06-24 RX ADMIN — IOHEXOL 25 ML: 240 INJECTION, SOLUTION INTRATHECAL; INTRAVASCULAR; INTRAVENOUS; ORAL at 12:15

## 2021-07-01 ENCOUNTER — TELEMEDICINE (OUTPATIENT)
Dept: MEDICAL GROUP | Facility: IMAGING CENTER | Age: 66
End: 2021-07-01
Payer: COMMERCIAL

## 2021-07-01 VITALS
HEART RATE: 93 BPM | DIASTOLIC BLOOD PRESSURE: 80 MMHG | TEMPERATURE: 97.5 F | BODY MASS INDEX: 19.81 KG/M2 | WEIGHT: 116 LBS | HEIGHT: 64 IN | SYSTOLIC BLOOD PRESSURE: 126 MMHG

## 2021-07-01 DIAGNOSIS — D72.819 LEUKOPENIA, UNSPECIFIED TYPE: ICD-10-CM

## 2021-07-01 DIAGNOSIS — G47.33 OBSTRUCTIVE SLEEP APNEA SYNDROME: ICD-10-CM

## 2021-07-01 DIAGNOSIS — I10 ESSENTIAL HYPERTENSION: ICD-10-CM

## 2021-07-01 PROCEDURE — 99213 OFFICE O/P EST LOW 20 MIN: CPT | Mod: 95 | Performed by: FAMILY MEDICINE

## 2021-07-01 ASSESSMENT — PAIN SCALES - GENERAL: PAINLEVEL: NO PAIN

## 2021-07-01 ASSESSMENT — FIBROSIS 4 INDEX: FIB4 SCORE: 1.494697261044734604

## 2021-07-01 NOTE — PROGRESS NOTES
Telemedicine: New Patient   This evaluation was conducted via Zoom using secure and encrypted videoconferencing technology. The patient was in a private location in the state of [place].    The patient's identity was confirmed and verbal consent was obtained for this virtual visit.    Subjective:     Chief Complaint   Patient presents with   • Insomnia     needs new cpap or evaulation   • Lab Results       Telma Vergara is a 65 y.o. female with hypertension, sleep apnea, and osteoporosis presenting to establish care and to discuss the evaluation and management of:  Review of lab results and needs new CPAP and evaluation for sleep apnea.    Sleep apnea-states that her CPAP machine is old and it has been years since she has had testing.  She does obtain her supplies through apnea.  She will report back in regards to insurance coverage of the companies available.    Hypertension-blood pressure mostly in the 120s to 130s over 80s.  She is on lisinopril 10 mg daily.  Blood pressure has been otherwise stable.    Slightly decreased white blood cell count on recent lab work.    ROS   Denies any recent fevers or chills. No nausea or vomiting. No diarrhea. No chest pains or shortness of breath. No lower extremity edema.      No Known Allergies    Current medicines (including changes today)  Current Outpatient Medications   Medication Sig Dispense Refill   • lisinopril (PRINIVIL) 10 MG Tab Take 1 tablet by mouth every day. 90 tablet 3   • Omega-3 Fatty Acids (FISH OIL) 1000 MG Cap capsule Take 1,000 mg by mouth 2 Times a Day. After meals     • Probiotic Product (PROBIOTIC-10 PO) Take  by mouth.       No current facility-administered medications for this visit.       She  has a past medical history of Hypertension and Sleep apnea (01/01/2012).  She  has a past surgical history that includes cholecystectomy (2013); bunionectomy (Right, 2015); eye surgery (2016); lumpectomy (2004,2014); primary c section; and pr breast  augmentation with implant.      Family History   Problem Relation Age of Onset   • Heart Disease Mother    • COPD Father    • Other Father    • Kidney Disease Father      Family Status   Relation Name Status   • Mo  (Not Specified)   • Fa  (Not Specified)       Social History     Socioeconomic History   • Marital status:      Spouse name: Not on file   • Number of children: Not on file   • Years of education: Not on file   • Highest education level: Some college, no degree   Occupational History   • Not on file   Tobacco Use   • Smoking status: Never Smoker   • Smokeless tobacco: Never Used   Vaping Use   • Vaping Use: Never used   Substance and Sexual Activity   • Alcohol use: Yes     Alcohol/week: 1.8 oz     Types: 3 Standard drinks or equivalent per week   • Drug use: Never   • Sexual activity: Yes     Partners: Male   Other Topics Concern   • Not on file   Social History Narrative   • Not on file     Social Determinants of Health     Financial Resource Strain: Low Risk    • Difficulty of Paying Living Expenses: Not hard at all   Food Insecurity: No Food Insecurity   • Worried About Running Out of Food in the Last Year: Never true   • Ran Out of Food in the Last Year: Never true   Transportation Needs: No Transportation Needs   • Lack of Transportation (Medical): No   • Lack of Transportation (Non-Medical): No   Physical Activity: Sufficiently Active   • Days of Exercise per Week: 7 days   • Minutes of Exercise per Session: 80 min   Stress: Stress Concern Present   • Feeling of Stress : To some extent   Social Connections: Moderately Isolated   • Frequency of Communication with Friends and Family: Three times a week   • Frequency of Social Gatherings with Friends and Family: Patient refused   • Attends Jew Services: Never   • Active Member of Clubs or Organizations: No   • Attends Club or Organization Meetings: Never   • Marital Status:    Intimate Partner Violence:    • Fear of Current or  "Ex-Partner:    • Emotionally Abused:    • Physically Abused:    • Sexually Abused:          Patient Active Problem List    Diagnosis Date Noted   • Essential hypertension 01/29/2021   • Obstructive sleep apnea syndrome 01/29/2021   • History of colon polyps 01/29/2021   • External hemorrhoid 01/29/2021   • Osteoporosis of lumbar spine 01/29/2021   • SOB (shortness of breath) on exertion 01/29/2021        Objective:   /80   Pulse 93   Temp 36.4 °C (97.5 °F)   Ht 1.626 m (5' 4\")   Wt 52.6 kg (116 lb)   BMI 19.91 kg/m²     Physical Exam  Constitutional: Alert, no distress, well-groomed.  Skin: No rashes in visible areas.  Eye: Round. Conjunctiva clear, lids normal. No icterus.   ENMT: Lips pink without lesions, good dentition, moist mucous membranes. Phonation normal.  Neck: No masses, no thyromegaly. Moves freely without pain.  CV: Pulse as reported by patient  Respiratory: Unlabored respiratory effort, no cough or audible wheeze  Psych: Alert and oriented x3, normal affect and mood.       Narrative & Impression  Transthoracic  Echo Report        Echocardiography Laboratory     CONCLUSIONS  Normal left ventricular systolic function.  Left ventricular ejection fraction is visually estimated to be 60-65%.  Aortic sclerosis without stenosis.  Normal right ventricular size and systolic function.  No prior study is available for comparison.   Narrative & Impression     6/24/2021 12:48 PM     HISTORY/REASON FOR EXAM:  adnexal fullness on exam, questionable finding on u/s.        TECHNIQUE/EXAM DESCRIPTION:   CT scan of the abdomen and pelvis with contrast.     Contrast-enhanced helical scanning was obtained from the diaphragmatic domes through the pubic symphysis following the bolus administration of nonionic contrast without complication.     100 mL of Omnipaque 350 nonionic contrast was administered without complication.     Low dose optimization technique was utilized for this CT exam including automated " exposure control and adjustment of the mA and/or kV according to patient size.     COMPARISON: Pelvic ultrasound 4/2/2021     FINDINGS:  Chest Base: Mild dependent changes.     Liver:  Normal.     Gallbladder: Gallbladder is surgically absent.     Biliary tract: Nondilated.     Pancreas: Normal.     Spleen: Normal.     Adrenals: Normal.     Kidneys and Collecting Systems:  Small cyst in the interpolar left kidney which does not require imaging follow-up. No hydronephrosis.     Gastrointestinal tract:  No bowel obstruction. The appendix is normal.     Peritoneum: No free air or free fluid.     Reproductive organs:  Normal. No definite adnexal mass is seen. No definite cyst to correlate to the abnormality seen on ultrasound.     Bladder:  Bladder is decompressed.     Vessels:  There is mild atherosclerosis.  Normal caliber vessels.     Lymph  Nodes:  No lymphadenopathy.     Abdominal wall: Within normal limits.     Bones:  No acute or aggressive abnormality.     IMPRESSION:        1.  No significant adnexal mass/cyst is visualized.  2.  No acute abnormality.  3.  Prior cholecystectomy.         Ref. Range 6/23/2021 09:26   WBC Latest Ref Range: 4.8 - 10.8 K/uL 3.5 (L)   RBC Latest Ref Range: 4.20 - 5.40 M/uL 4.64   Hemoglobin Latest Ref Range: 12.0 - 16.0 g/dL 13.9   Hematocrit Latest Ref Range: 37.0 - 47.0 % 40.8   MCV Latest Ref Range: 81.4 - 97.8 fL 87.9   MCH Latest Ref Range: 27.0 - 33.0 pg 30.0   MCHC Latest Ref Range: 33.6 - 35.0 g/dL 34.1   RDW Latest Ref Range: 35.9 - 50.0 fL 45.1   Platelet Count Latest Ref Range: 164 - 446 K/uL 205   MPV Latest Ref Range: 9.0 - 12.9 fL 10.8   Neutrophils-Polys Latest Ref Range: 44.00 - 72.00 % 48.50   Neutrophils (Absolute) Latest Ref Range: 2.00 - 7.15 K/uL 1.69 (L)   Lymphocytes Latest Ref Range: 22.00 - 41.00 % 40.70   Lymphs (Absolute) Latest Ref Range: 1.00 - 4.80 K/uL 1.42   Monocytes Latest Ref Range: 0.00 - 13.40 % 7.40   Monos (Absolute) Latest Ref Range: 0.00 -  0.85 K/uL 0.26   Eosinophils Latest Ref Range: 0.00 - 6.90 % 2.00   Eos (Absolute) Latest Ref Range: 0.00 - 0.51 K/uL 0.07   Basophils Latest Ref Range: 0.00 - 1.80 % 1.10   Baso (Absolute) Latest Ref Range: 0.00 - 0.12 K/uL 0.04   Immature Granulocytes Latest Ref Range: 0.00 - 0.90 % 0.30   Immature Granulocytes (abs) Latest Ref Range: 0.00 - 0.11 K/uL 0.01   Nucleated RBC Latest Units: /100 WBC 0.00   NRBC (Absolute) Latest Units: K/uL 0.00   Sodium Latest Ref Range: 135 - 145 mmol/L 142   Potassium Latest Ref Range: 3.6 - 5.5 mmol/L 4.0   Chloride Latest Ref Range: 96 - 112 mmol/L 107   Co2 Latest Ref Range: 20 - 33 mmol/L 24   Anion Gap Latest Ref Range: 7.0 - 16.0  11.0   Glucose Latest Ref Range: 65 - 99 mg/dL 92   Bun Latest Ref Range: 8 - 22 mg/dL 11   Creatinine Latest Ref Range: 0.50 - 1.40 mg/dL 0.76   GFR If  Latest Ref Range: >60 mL/min/1.73 m 2 >60   GFR If Non  Latest Ref Range: >60 mL/min/1.73 m 2 >60   Calcium Latest Ref Range: 8.5 - 10.5 mg/dL 9.0   Albumin Latest Ref Range: 3.2 - 4.9 g/dL 4.4   Phosphorus Latest Ref Range: 2.5 - 4.5 mg/dL 3.0   Vitamin B12 -True Cobalamin Latest Ref Range: 211 - 911 pg/mL 560         Assessment and Plan:   The following treatment plan was discussed:     This is a 65 y.o. female with hypertension, osteoporosis and sleep apnea.    1. Obstructive sleep apnea syndrome     2. Essential hypertension     3. Leukopenia, unspecified type  CBC WITH DIFFERENTIAL   -BERNADETTE-patient is in need of repeat evaluation and adjustment of CPAP and supplies.  Patient will report of which company's are covered by her insurance.  -Essential hypertension-blood pressure has been otherwise stable.  Continue on lisinopril 10 mg daily.  Continue to monitor.  Recommend heart healthy diet routine exercise.  -Leukopenia-mild.  We will continue to monitor at this time.  Repeat labs in 4 to 6 months.  Follow-up sooner as needed.    Follow-up: Return in about 6 months  (around 1/1/2022).          This medical record contains text that has been entered with the assistance of computer voice recognition and dictation software.  Therefore, it may contain unintended errors in text, spelling, punctuation, or grammar.

## 2021-08-03 ENCOUNTER — APPOINTMENT (RX ONLY)
Dept: URBAN - METROPOLITAN AREA CLINIC 38 | Facility: CLINIC | Age: 66
Setting detail: DERMATOLOGY
End: 2021-08-03

## 2021-08-03 DIAGNOSIS — Z71.89 OTHER SPECIFIED COUNSELING: ICD-10-CM

## 2021-08-03 DIAGNOSIS — L57.0 ACTINIC KERATOSIS: ICD-10-CM

## 2021-08-03 DIAGNOSIS — L82.1 OTHER SEBORRHEIC KERATOSIS: ICD-10-CM

## 2021-08-03 DIAGNOSIS — D18.0 HEMANGIOMA: ICD-10-CM

## 2021-08-03 DIAGNOSIS — L81.4 OTHER MELANIN HYPERPIGMENTATION: ICD-10-CM

## 2021-08-03 DIAGNOSIS — D22 MELANOCYTIC NEVI: ICD-10-CM

## 2021-08-03 PROBLEM — D22.9 MELANOCYTIC NEVI, UNSPECIFIED: Status: ACTIVE | Noted: 2021-08-03

## 2021-08-03 PROBLEM — D23.39 OTHER BENIGN NEOPLASM OF SKIN OF OTHER PARTS OF FACE: Status: ACTIVE | Noted: 2021-08-03

## 2021-08-03 PROBLEM — D18.01 HEMANGIOMA OF SKIN AND SUBCUTANEOUS TISSUE: Status: ACTIVE | Noted: 2021-08-03

## 2021-08-03 PROCEDURE — 99202 OFFICE O/P NEW SF 15 MIN: CPT | Mod: 25

## 2021-08-03 PROCEDURE — ? COUNSELING

## 2021-08-03 PROCEDURE — ? LIQUID NITROGEN

## 2021-08-03 PROCEDURE — 17000 DESTRUCT PREMALG LESION: CPT

## 2021-08-03 ASSESSMENT — LOCATION SIMPLE DESCRIPTION DERM
LOCATION SIMPLE: RIGHT PRETIBIAL REGION
LOCATION SIMPLE: ABDOMEN
LOCATION SIMPLE: UPPER BACK
LOCATION SIMPLE: RIGHT UPPER BACK

## 2021-08-03 ASSESSMENT — LOCATION DETAILED DESCRIPTION DERM
LOCATION DETAILED: RIGHT INFERIOR UPPER BACK
LOCATION DETAILED: RIGHT PROXIMAL PRETIBIAL REGION
LOCATION DETAILED: PERIUMBILICAL SKIN
LOCATION DETAILED: SUPERIOR THORACIC SPINE

## 2021-08-03 ASSESSMENT — LOCATION ZONE DERM
LOCATION ZONE: LEG
LOCATION ZONE: TRUNK

## 2021-08-03 NOTE — PROCEDURE: LIQUID NITROGEN
Detail Level: Detailed
Show Aperture Variable?: Yes
Render Note In Bullet Format When Appropriate: No
Consent: The patient's consent was obtained including but not limited to risks of crusting, scabbing, blistering, scarring, darker or lighter pigmentary change, recurrence, incomplete removal and infection.
Duration Of Freeze Thaw-Cycle (Seconds): 0
Post-Care Instructions: I reviewed with the patient in detail post-care instructions. Patient is to wear sunprotection, and avoid picking at any of the treated lesions. Pt may apply Vaseline to crusted or scabbing areas.

## 2022-05-12 SDOH — HEALTH STABILITY: PHYSICAL HEALTH: ON AVERAGE, HOW MANY MINUTES DO YOU ENGAGE IN EXERCISE AT THIS LEVEL?: 90 MIN

## 2022-05-12 SDOH — ECONOMIC STABILITY: INCOME INSECURITY: HOW HARD IS IT FOR YOU TO PAY FOR THE VERY BASICS LIKE FOOD, HOUSING, MEDICAL CARE, AND HEATING?: NOT HARD AT ALL

## 2022-05-12 SDOH — ECONOMIC STABILITY: FOOD INSECURITY: WITHIN THE PAST 12 MONTHS, YOU WORRIED THAT YOUR FOOD WOULD RUN OUT BEFORE YOU GOT MONEY TO BUY MORE.: NEVER TRUE

## 2022-05-12 SDOH — ECONOMIC STABILITY: HOUSING INSECURITY: IN THE LAST 12 MONTHS, HOW MANY PLACES HAVE YOU LIVED?: 1

## 2022-05-12 SDOH — ECONOMIC STABILITY: FOOD INSECURITY: WITHIN THE PAST 12 MONTHS, THE FOOD YOU BOUGHT JUST DIDN'T LAST AND YOU DIDN'T HAVE MONEY TO GET MORE.: NEVER TRUE

## 2022-05-12 SDOH — HEALTH STABILITY: MENTAL HEALTH
STRESS IS WHEN SOMEONE FEELS TENSE, NERVOUS, ANXIOUS, OR CAN'T SLEEP AT NIGHT BECAUSE THEIR MIND IS TROUBLED. HOW STRESSED ARE YOU?: ONLY A LITTLE

## 2022-05-12 SDOH — ECONOMIC STABILITY: INCOME INSECURITY: IN THE LAST 12 MONTHS, WAS THERE A TIME WHEN YOU WERE NOT ABLE TO PAY THE MORTGAGE OR RENT ON TIME?: NO

## 2022-05-12 SDOH — HEALTH STABILITY: PHYSICAL HEALTH: ON AVERAGE, HOW MANY DAYS PER WEEK DO YOU ENGAGE IN MODERATE TO STRENUOUS EXERCISE (LIKE A BRISK WALK)?: 6 DAYS

## 2022-05-12 SDOH — ECONOMIC STABILITY: TRANSPORTATION INSECURITY
IN THE PAST 12 MONTHS, HAS LACK OF TRANSPORTATION KEPT YOU FROM MEETINGS, WORK, OR FROM GETTING THINGS NEEDED FOR DAILY LIVING?: NO

## 2022-05-12 ASSESSMENT — SOCIAL DETERMINANTS OF HEALTH (SDOH)
HOW OFTEN DO YOU ATTEND CHURCH OR RELIGIOUS SERVICES?: NEVER
HOW OFTEN DO YOU HAVE A DRINK CONTAINING ALCOHOL: 2-3 TIMES A WEEK
DO YOU BELONG TO ANY CLUBS OR ORGANIZATIONS SUCH AS CHURCH GROUPS UNIONS, FRATERNAL OR ATHLETIC GROUPS, OR SCHOOL GROUPS?: NO
HOW OFTEN DO YOU ATTEND CHURCH OR RELIGIOUS SERVICES?: NEVER
HOW OFTEN DO YOU ATTENT MEETINGS OF THE CLUB OR ORGANIZATION YOU BELONG TO?: NEVER
IN A TYPICAL WEEK, HOW MANY TIMES DO YOU TALK ON THE PHONE WITH FAMILY, FRIENDS, OR NEIGHBORS?: MORE THAN THREE TIMES A WEEK
WITHIN THE PAST 12 MONTHS, YOU WORRIED THAT YOUR FOOD WOULD RUN OUT BEFORE YOU GOT THE MONEY TO BUY MORE: NEVER TRUE
HOW MANY DRINKS CONTAINING ALCOHOL DO YOU HAVE ON A TYPICAL DAY WHEN YOU ARE DRINKING: 1 OR 2
DO YOU BELONG TO ANY CLUBS OR ORGANIZATIONS SUCH AS CHURCH GROUPS UNIONS, FRATERNAL OR ATHLETIC GROUPS, OR SCHOOL GROUPS?: NO
HOW OFTEN DO YOU HAVE SIX OR MORE DRINKS ON ONE OCCASION: NEVER
HOW OFTEN DO YOU ATTENT MEETINGS OF THE CLUB OR ORGANIZATION YOU BELONG TO?: NEVER
HOW OFTEN DO YOU GET TOGETHER WITH FRIENDS OR RELATIVES?: THREE TIMES A WEEK
IN A TYPICAL WEEK, HOW MANY TIMES DO YOU TALK ON THE PHONE WITH FAMILY, FRIENDS, OR NEIGHBORS?: MORE THAN THREE TIMES A WEEK
HOW HARD IS IT FOR YOU TO PAY FOR THE VERY BASICS LIKE FOOD, HOUSING, MEDICAL CARE, AND HEATING?: NOT HARD AT ALL
HOW OFTEN DO YOU GET TOGETHER WITH FRIENDS OR RELATIVES?: THREE TIMES A WEEK

## 2022-05-12 ASSESSMENT — LIFESTYLE VARIABLES
SKIP TO QUESTIONS 9-10: 1
HOW MANY STANDARD DRINKS CONTAINING ALCOHOL DO YOU HAVE ON A TYPICAL DAY: 1 OR 2
HOW OFTEN DO YOU HAVE SIX OR MORE DRINKS ON ONE OCCASION: NEVER
AUDIT-C TOTAL SCORE: 3
HOW OFTEN DO YOU HAVE A DRINK CONTAINING ALCOHOL: 2-3 TIMES A WEEK

## 2022-05-13 ENCOUNTER — OFFICE VISIT (OUTPATIENT)
Dept: MEDICAL GROUP | Facility: IMAGING CENTER | Age: 67
End: 2022-05-13
Payer: MEDICARE

## 2022-05-13 ENCOUNTER — HOSPITAL ENCOUNTER (OUTPATIENT)
Dept: RADIOLOGY | Facility: MEDICAL CENTER | Age: 67
End: 2022-05-13
Attending: FAMILY MEDICINE
Payer: MEDICARE

## 2022-05-13 VITALS
SYSTOLIC BLOOD PRESSURE: 132 MMHG | WEIGHT: 114.4 LBS | HEART RATE: 70 BPM | BODY MASS INDEX: 19.53 KG/M2 | HEIGHT: 64 IN | DIASTOLIC BLOOD PRESSURE: 84 MMHG | TEMPERATURE: 98.1 F | RESPIRATION RATE: 14 BRPM | OXYGEN SATURATION: 96 %

## 2022-05-13 DIAGNOSIS — R68.82 LIBIDO, DECREASED: ICD-10-CM

## 2022-05-13 DIAGNOSIS — Z00.00 MEDICARE ANNUAL WELLNESS VISIT, INITIAL: ICD-10-CM

## 2022-05-13 DIAGNOSIS — Z86.010 HISTORY OF COLON POLYPS: ICD-10-CM

## 2022-05-13 DIAGNOSIS — G47.33 OBSTRUCTIVE SLEEP APNEA SYNDROME: ICD-10-CM

## 2022-05-13 DIAGNOSIS — M85.89 OSTEOPENIA OF MULTIPLE SITES: ICD-10-CM

## 2022-05-13 DIAGNOSIS — I10 ESSENTIAL HYPERTENSION: ICD-10-CM

## 2022-05-13 DIAGNOSIS — Z12.31 VISIT FOR SCREENING MAMMOGRAM: ICD-10-CM

## 2022-05-13 DIAGNOSIS — R41.3 MEMORY PROBLEM: ICD-10-CM

## 2022-05-13 PROBLEM — M81.0 OSTEOPOROSIS OF LUMBAR SPINE: Status: RESOLVED | Noted: 2021-01-29 | Resolved: 2022-05-13

## 2022-05-13 PROCEDURE — 77063 BREAST TOMOSYNTHESIS BI: CPT

## 2022-05-13 PROCEDURE — G0402 INITIAL PREVENTIVE EXAM: HCPCS | Performed by: FAMILY MEDICINE

## 2022-05-13 PROCEDURE — 77067 SCR MAMMO BI INCL CAD: CPT

## 2022-05-13 RX ORDER — COVID-19 MOLECULAR TEST ASSAY
KIT MISCELLANEOUS
COMMUNITY
Start: 2022-03-14 | End: 2022-05-13

## 2022-05-13 ASSESSMENT — ENCOUNTER SYMPTOMS: GENERAL WELL-BEING: EXCELLENT

## 2022-05-13 ASSESSMENT — ACTIVITIES OF DAILY LIVING (ADL): BATHING_REQUIRES_ASSISTANCE: 0

## 2022-05-13 ASSESSMENT — FIBROSIS 4 INDEX: FIB4 SCORE: 1.52

## 2022-05-13 ASSESSMENT — PAIN SCALES - GENERAL: PAINLEVEL: NO PAIN

## 2022-05-13 ASSESSMENT — PATIENT HEALTH QUESTIONNAIRE - PHQ9: CLINICAL INTERPRETATION OF PHQ2 SCORE: 0

## 2022-05-13 NOTE — PROGRESS NOTES
Chief Complaint   Patient presents with   • Medicare Annual Wellness         HPI:  Telma is a 66 y.o. here for Medicare Annual Wellness Visit    Using a digital watch. Was nervous during 3/3 recall and clock drawing.   May take a while to remember names.   No mention from family members of any memory issues for her. No family hx of dementia.     BERNADETTE- on cpap regular use.     Lab work -will do on Monday.   Hypertension- 120-130s/80s. Lisinopril 10 mg. Stable. HR 90s.   Has had prior echocardiogram. Hx of SOB on exertion, notes at higher altitudes.     Osteopenia- 3/2021. No supplements. Dark greens. Yogurt.     Regular exercise.   2021- colonoscopy. Mucosal polyp. 10 years recall.   [place] 5 lbs in 5 days. Body mass index is 19.64 kg/m².  2021- pap negative, hpv negative. Sex drive - decreased.       Patient Active Problem List    Diagnosis Date Noted   • Essential hypertension 01/29/2021   • Obstructive sleep apnea syndrome 01/29/2021   • History of colon polyps 01/29/2021   • External hemorrhoid 01/29/2021   • Osteoporosis of lumbar spine 01/29/2021   • SOB (shortness of breath) on exertion 01/29/2021       Current Outpatient Medications   Medication Sig Dispense Refill   • lisinopril (PRINIVIL) 10 MG Tab Take 1 Tablet by mouth every day. 90 Tablet 3   • Omega-3 Fatty Acids (FISH OIL) 1000 MG Cap capsule Take 1,000 mg by mouth 2 Times a Day. After meals     • Probiotic Product (PROBIOTIC-10 PO) Take  by mouth.       No current facility-administered medications for this visit.        Patient is taking medications as noted in medication list.  Current supplements as per medication list.     Allergies: Patient has no known allergies.    Current social contact/activities: Volunteers, spends time with friends, spends time with family.     Is patient current with immunizations? No, due for SHINGRIX (Shingles). Patient is interested in receiving NONE today.    She  reports that she has never  smoked. She has never used smokeless tobacco. She reports current alcohol use of about 1.8 oz of alcohol per week. She reports that she does not use drugs.  Counseling given: Not Answered    DPA/Advanced directive: Patient has Advanced Directive, but it is not on file. Instructed to bring in a copy to scan into their chart.    ROS:    Gait: Uses no assistive device   Ostomy: No   Other tubes: No   Amputations: No   Chronic oxygen use No   Last eye exam A few weeks ago   Wears hearing aids: No   : Reports urinary leakage during the last 6 months that has not interfered at all with their daily activities or sleep.    Screening:    Reviewed.     Depression Screening  Little interest or pleasure in doing things?  0 - not at all  Feeling down, depressed, or hopeless? 0 - not at all  Patient Health Questionnaire Score: 0    If depressive symptoms identified deferred to follow up visit unless specifically addressed in assessment and plan.    Interpretation of PHQ-9 Total Score   Score Severity   1-4 No Depression   5-9 Mild Depression   10-14 Moderate Depression   15-19 Moderately Severe Depression   20-27 Severe Depression    Screening for Cognitive Impairment  Three Minute Recall (daughter, heaven, mountain)  1/3    Yovanny clock face with all 12 numbers and set the hands to show 10 past 11.  No    If cognitive concerns identified, deferred for follow up unless specifically addressed in assessment and plan.    Fall Risk Assessment  Has the patient had two or more falls in the last year or any fall with injury in the last year?  No  If fall risk identified, deferred for follow up unless specifically addressed in assessment and plan.    Safety Assessment  Throw rugs on floor.  Yes  Handrails on all stairs.  Yes  Good lighting in all hallways.  Yes  Difficulty hearing.  No  Patient counseled about all safety risks that were identified.    Functional Assessment ADLs  Are there any barriers preventing you from cooking for  yourself or meeting nutritional needs?  No.    Are there any barriers preventing you from driving safely or obtaining transportation?  No.    Are there any barriers preventing you from using a telephone or calling for help?  No.    Are there any barriers preventing you from shopping?  No.    Are there any barriers preventing you from taking care of your own finances?  No.    Are there any barriers preventing you from managing your medications?  No.    Are there any barriers preventing you from showering, bathing or dressing yourself?  No.    Are you currently engaging in any exercise or physical activity?  Yes.  Walk, hiking and strength conditioning weights 5-6 times a week.   What is your perception of your health?  Excellent.    Health Maintenance Summary          Overdue - Annual Wellness Visit (Every 366 Days) Overdue - never done    No completion history exists for this topic.          Overdue - IMM ZOSTER VACCINES (1 of 2) Overdue - never done    No completion history exists for this topic.          Scheduled - MAMMOGRAM (Yearly) Scheduled for 5/13/2022 04/02/2021  MA-SCREENING MAMMO BILAT W/IMPLANTS W/SHARONDA W/CAD          IMM PNEUMOCOCCAL VACCINE: 65+ Years (2 - PCV) Next due on 10/1/2022    10/01/2021  Outside Immunization: PPSV23          PAP SMEAR (Every 3 Years) Next due on 2/25/2024 02/25/2021  THINPREP PAP WITH HPV    02/25/2021  Pathology Gynecology Specimen          BONE DENSITY (Every 5 Years) Next due on 3/2/2026    03/02/2021  DS-BONE DENSITY STUDY (DEXA)          IMM DTaP/Tdap/Td Vaccine (4 - Td or Tdap) Next due on 2/22/2030 02/22/2020  Imm Admin: Tdap Vaccine    09/01/2016  Outside Immunization: Tdap    07/01/2010  Outside Immunization: Tdap          COLORECTAL CANCER SCREENING (COLONOSCOPY - Every 10 Years) Tentatively due on 12/22/2031 12/22/2021  Referral to GI for Colonoscopy          HEPATITIS C SCREENING  Completed    02/25/2021  HEP C VIRUS ANTIBODY          IMM INFLUENZA  (Series Information) Completed    10/01/2021  Outside Immunization: Fluzone High-Dose Quad    10/09/2020  Imm Admin: Influenza Vac Subunit Quad Inj (Pf)    10/04/2019  Imm Admin: Influenza Vaccine Quad Inj (Pf)    10/28/2018  Imm Admin: Influenza Vac Subunit Quad Inj (Pf)    09/18/2017  Outside Immunization: Flu quadrivalent injectable pfree    Only the first 5 history entries have been loaded, but more history exists.          COVID-19 Vaccine (Series Information) Completed    11/17/2021  Imm Admin: Moderna SARS-CoV-2 Vaccine    03/10/2021  Imm Admin: Moderna SARS-CoV-2 Vaccine    02/10/2021  Imm Admin: Moderna SARS-CoV-2 Vaccine          IMM HEP B VACCINE (Series Information) Aged Out    No completion history exists for this topic.          IMM MENINGOCOCCAL VACCINE (MCV4) (Series Information) Aged Out    No completion history exists for this topic.                Patient Care Team:  Mary Ellen Yousif M.D. as PCP - General (Family Medicine)    Social History     Tobacco Use   • Smoking status: Never Smoker   • Smokeless tobacco: Never Used   Vaping Use   • Vaping Use: Never used   Substance Use Topics   • Alcohol use: Yes     Alcohol/week: 1.8 oz     Types: 3 Standard drinks or equivalent per week     Comment: 2 - 3 glasses 4 days a week   • Drug use: Never     Family History   Problem Relation Age of Onset   • Heart Disease Mother    • Hypertension Mother    • COPD Father    • Other Father    • Kidney Disease Father    • Hyperlipidemia Father    • Alcohol abuse Father      She  has a past medical history of Hypertension and Sleep apnea (01/01/2012).   Past Surgical History:   Procedure Laterality Date   • EYE SURGERY  2016    eye lens implants   • BUNIONECTOMY Right 2015   • CHOLECYSTECTOMY  2013   • LUMPECTOMY  2004,2014    breast implants and revision   • CT BREAST AUGMENTATION WITH IMPLANT     • PRIMARY C SECTION      1989           Exam:     /84   Pulse 70   Temp 36.7 °C (98.1 °F)   Resp 14   Ht 1.626  "m (5' 4\")   Wt 51.9 kg (114 lb 6.4 oz)   SpO2 96%  Body mass index is 19.64 kg/m².    Hearing good.    Dentition good  Alert, oriented in no acute distress.  Eye contact is good, speech goal directed, affect calm  Constitutional: She appears well-developed and well-nourished. She appears not diaphoretic. No distress.   HENT: Right Ear: External ear normal. Left Ear: External ear normal. Tympanic membranes clear and intact.   Nose: Nose normal.   Mouth/Throat: Oropharynx is clear and moist. No oropharyngeal exudate.     Eyes: Conjunctivae and extraocular motions are normal. Pupils are equal, round, and reactive to light. No scleral icterus.   Neck: Normal range of motion. Neck supple. No thyromegaly present.   Cardiovascular: Normal rate, regular rhythm, normal heart sounds and intact distal pulses.  Exam reveals no gallop and no friction rub.  No murmur heard. No carotid bruits.   Pulmonary/Chest: Effort normal and breath sounds normal. No respiratory distress. She has no wheezes. She has no rales.   Abdominal: Soft. Bowel sounds are normal. She exhibits no distension and no mass. No tenderness. She has no rebound and no guarding.   Lymphadenopathy:  She has no cervical adenopathy.   Neurological: She is alert. She has normal reflexes. No cranial nerve deficit. She exhibits normal muscle tone.   Skin: Skin is warm and dry. No rash noted. She is not diaphoretic. No erythema.   Psychiatric: She has a normal mood and affect. Her behavior is normal.   Musculoskeletal: She exhibits no edema. Full strength throughout. 2+ DTR throughout.       Assessment and Plan. The following treatment and monitoring plan is recommended:      67 yo female for AWV    1. Medicare annual wellness visit, initial   -Recommend healthy diet and routine exercise.  Recommend routine balance exercises.    2. Memory problem-patient does not feel she has any significant memory issues.  However, 1/3 recall and had difficulty drawing a clock and " time today.  Patient does note feeling nervous during the test which may contribute as well. Patient will further inquire with her family members regarding any concerns.  Wwill monitor at this time.  Follow-up in 3 months.    3. Obstructive sleep apnea syndrome -stable, continue cpap.     4. Essential hypertension -old, continue current medication.  Recommend heart healthy diet and routine exercise.  Continue to monitor.    5. Osteopenia of multiple sites -DEXA 2021.  Recommend routine weightbearing exercise, adequate calcium vitamin D intake.  Plan to repeat DEXA 2 years from last.  Monitor.    6. History of colon polyps -last colonoscopy with mucosal tissue noted on pathology of polyp.  Recall in 10 years was recommended.    7. Body mass index (BMI) of 19.0 to 19.9 in adult -is very active.  Discussed regular adequate dietary intake and reviewed protein intake.  Recommend avoid further weight loss to help support her osteopenia.    8. Libido, decreased   Reviewed options, she will plan to look into Dr. Castillo.         Services suggested: No services needed at this time  Health Care Screening recommendations as per orders if indicated.  Referrals offered: PT/OT/Nutrition counseling/Behavioral Health/Smoking cessation as per orders if indicated.    Discussion today about general wellness and lifestyle habits:    · Prevent falls and reduce trip hazards; Cautioned about securing or removing rugs.  · Have a working fire alarm and carbon monoxide detector;   · Engage in regular physical activity and social activities       Follow-up: Return in about 2 months (around 7/13/2022).     This medical record contains text that has been entered with the assistance of computer voice recognition and dictation software.  Therefore, it may contain unintended errors in text, spelling, punctuation, or grammar.

## 2022-05-16 ENCOUNTER — HOSPITAL ENCOUNTER (OUTPATIENT)
Dept: LAB | Facility: MEDICAL CENTER | Age: 67
End: 2022-05-16
Attending: FAMILY MEDICINE
Payer: MEDICARE

## 2022-05-16 DIAGNOSIS — Z13.29 SCREENING FOR ENDOCRINE DISORDER: ICD-10-CM

## 2022-05-16 DIAGNOSIS — Z13.220 SCREENING, LIPID: ICD-10-CM

## 2022-05-16 DIAGNOSIS — Z13.0 SCREENING FOR DEFICIENCY ANEMIA: ICD-10-CM

## 2022-05-16 DIAGNOSIS — Z13.21 ENCOUNTER FOR VITAMIN DEFICIENCY SCREENING: ICD-10-CM

## 2022-05-16 DIAGNOSIS — Z13.1 SCREENING FOR DIABETES MELLITUS: ICD-10-CM

## 2022-05-16 LAB
ALBUMIN SERPL BCP-MCNC: 4.3 G/DL (ref 3.2–4.9)
ALBUMIN/GLOB SERPL: 1.8 G/DL
ALP SERPL-CCNC: 86 U/L (ref 30–99)
ALT SERPL-CCNC: 24 U/L (ref 2–50)
ANION GAP SERPL CALC-SCNC: 10 MMOL/L (ref 7–16)
AST SERPL-CCNC: 24 U/L (ref 12–45)
BASOPHILS # BLD AUTO: 0.9 % (ref 0–1.8)
BASOPHILS # BLD: 0.03 K/UL (ref 0–0.12)
BILIRUB SERPL-MCNC: 0.7 MG/DL (ref 0.1–1.5)
BUN SERPL-MCNC: 15 MG/DL (ref 8–22)
CALCIUM SERPL-MCNC: 9.3 MG/DL (ref 8.5–10.5)
CHLORIDE SERPL-SCNC: 104 MMOL/L (ref 96–112)
CHOLEST SERPL-MCNC: 219 MG/DL (ref 100–199)
CO2 SERPL-SCNC: 25 MMOL/L (ref 20–33)
CREAT SERPL-MCNC: 0.78 MG/DL (ref 0.5–1.4)
EOSINOPHIL # BLD AUTO: 0.08 K/UL (ref 0–0.51)
EOSINOPHIL NFR BLD: 2.3 % (ref 0–6.9)
ERYTHROCYTE [DISTWIDTH] IN BLOOD BY AUTOMATED COUNT: 47.6 FL (ref 35.9–50)
FASTING STATUS PATIENT QL REPORTED: NORMAL
GFR SERPLBLD CREATININE-BSD FMLA CKD-EPI: 84 ML/MIN/1.73 M 2
GLOBULIN SER CALC-MCNC: 2.4 G/DL (ref 1.9–3.5)
GLUCOSE SERPL-MCNC: 83 MG/DL (ref 65–99)
HCT VFR BLD AUTO: 38.2 % (ref 37–47)
HDLC SERPL-MCNC: 112 MG/DL
HGB BLD-MCNC: 12.8 G/DL (ref 12–16)
IMM GRANULOCYTES # BLD AUTO: 0.01 K/UL (ref 0–0.11)
IMM GRANULOCYTES NFR BLD AUTO: 0.3 % (ref 0–0.9)
LDLC SERPL CALC-MCNC: 100 MG/DL
LYMPHOCYTES # BLD AUTO: 1.34 K/UL (ref 1–4.8)
LYMPHOCYTES NFR BLD: 38.3 % (ref 22–41)
MCH RBC QN AUTO: 29.6 PG (ref 27–33)
MCHC RBC AUTO-ENTMCNC: 33.5 G/DL (ref 33.6–35)
MCV RBC AUTO: 88.2 FL (ref 81.4–97.8)
MONOCYTES # BLD AUTO: 0.28 K/UL (ref 0–0.85)
MONOCYTES NFR BLD AUTO: 8 % (ref 0–13.4)
NEUTROPHILS # BLD AUTO: 1.76 K/UL (ref 2–7.15)
NEUTROPHILS NFR BLD: 50.2 % (ref 44–72)
NRBC # BLD AUTO: 0 K/UL
NRBC BLD-RTO: 0 /100 WBC
PLATELET # BLD AUTO: 217 K/UL (ref 164–446)
PMV BLD AUTO: 10.9 FL (ref 9–12.9)
POTASSIUM SERPL-SCNC: 4.1 MMOL/L (ref 3.6–5.5)
PROT SERPL-MCNC: 6.7 G/DL (ref 6–8.2)
RBC # BLD AUTO: 4.33 M/UL (ref 4.2–5.4)
SODIUM SERPL-SCNC: 139 MMOL/L (ref 135–145)
TRIGL SERPL-MCNC: 33 MG/DL (ref 0–149)
WBC # BLD AUTO: 3.5 K/UL (ref 4.8–10.8)

## 2022-05-16 PROCEDURE — 80061 LIPID PANEL: CPT | Mod: GA

## 2022-05-16 PROCEDURE — 80053 COMPREHEN METABOLIC PANEL: CPT

## 2022-05-16 PROCEDURE — 84443 ASSAY THYROID STIM HORMONE: CPT | Mod: GA

## 2022-05-16 PROCEDURE — 85025 COMPLETE CBC W/AUTO DIFF WBC: CPT | Mod: GA

## 2022-05-16 PROCEDURE — 82306 VITAMIN D 25 HYDROXY: CPT | Mod: GA

## 2022-05-16 PROCEDURE — 36415 COLL VENOUS BLD VENIPUNCTURE: CPT | Mod: GA

## 2022-05-16 PROCEDURE — 82306 VITAMIN D 25 HYDROXY: CPT

## 2022-05-17 LAB — TSH SERPL DL<=0.005 MIU/L-ACNC: 1.28 UIU/ML (ref 0.38–5.33)

## 2022-05-21 LAB — 25(OH)D3 SERPL-MCNC: 32 NG/ML (ref 30–80)

## 2022-07-20 ENCOUNTER — PATIENT MESSAGE (OUTPATIENT)
Dept: MEDICAL GROUP | Facility: IMAGING CENTER | Age: 67
End: 2022-07-20
Payer: MEDICARE

## 2022-07-20 ENCOUNTER — TELEMEDICINE (OUTPATIENT)
Dept: MEDICAL GROUP | Facility: IMAGING CENTER | Age: 67
End: 2022-07-20
Payer: MEDICARE

## 2022-07-20 VITALS
DIASTOLIC BLOOD PRESSURE: 85 MMHG | OXYGEN SATURATION: 93 % | WEIGHT: 167 LBS | TEMPERATURE: 98.4 F | BODY MASS INDEX: 28.51 KG/M2 | HEIGHT: 64 IN | SYSTOLIC BLOOD PRESSURE: 126 MMHG | HEART RATE: 80 BPM

## 2022-07-20 DIAGNOSIS — U07.1 COVID-19 VIRUS INFECTION: ICD-10-CM

## 2022-07-20 PROCEDURE — 99213 OFFICE O/P EST LOW 20 MIN: CPT | Mod: 95

## 2022-07-20 ASSESSMENT — FIBROSIS 4 INDEX: FIB4 SCORE: 1.49

## 2022-07-20 NOTE — PATIENT INSTRUCTIONS
Discussed use Paxlovid antiviral treatment's potential to reduce risk of hospitalization and mortality. Side effects are diarrhea, hypertension, and muscle aches, altered taste discussed. Patient agreeable to this.     Advised patient to continue with her current regimen OTC antihistamine oral medication, Flonase nasal spray, Nasal flushes, and chloraseptic spray.     Recommend supportive measures including humidifier, warm salt water gargles, over-the-counter Cepacol throat lozenges, rest  and increased fluids.     May take OTC immune booster supplementation that contains Vitamin C, D, E, zinc, B vitamins.    Recommend to take Mucinex as needed during the day and use throat lozenges such as Ricola. Use dextromethorphan for cough only at night to allow the body to expel the pathogen during the day.  Take 5-10 deep breaths intermittently throughout the day and move the body as tolerated to aid in respiration.    Patient was encouraged to seek treatment in the ER or urgent care for worsening symptoms, fever greater than 100.5, wheezes, shortness of breath, dyspnea, or syncope.

## 2022-07-20 NOTE — PATIENT COMMUNICATION
Pt tested positive today for COVID, symptoms started yesterday.  Mild symptoms, fever and sore throat.  Should she get Paxlovid?  I made her a virtual visit with YIN Yin.

## 2022-07-20 NOTE — PROGRESS NOTES
Virtual Visit: Established Patient   This visit was conducted via Zoom using secure and encrypted videoconferencing technology.   The patient was in their home in the state [facility].    The patient's identity was confirmed and verbal consent was obtained for this virtual visit.    Subjective:   CC:   Chief Complaint   Patient presents with   • Follow-Up     Minor sore throat, no fever, tired, and tested positive for covid     Telma Vergara is a 66 y.o. female presenting for evaluation and management of:    Patient reports of developing symptoms of sneezing, fever, sore throat, dry cough, slight body aches, and headache starting yesterday. Patient tested positive for Covid test today. Patient took Advil today which helped with body aches.  Patient also takes Excedrin for headaches which is helpful.  Patient denies having fever today.  Her symptoms remain persistent.  Patient is vaccinated and boosted for Covid.  Patient wanting Paxlovid treatment for COVID. Patient's appetite has remained unchanged.  She reports drinking plenty of water to keep her self well-hydrated.  No shortness of breath, difficulty breathing, wheezing, lethargy, or malaise.       ROS: per hpi      Current medicines (including changes today)  Current Outpatient Medications   Medication Sig Dispense Refill   • Nirmatrelvir & Ritonavir 20 x 150 MG & 10 x 100MG Tablet Therapy Pack Take 300 mg nirmatrelvir (two 150 mg tablets) with 100 mg ritonavir (one 100 mg tablet) by mouth, with all three tablets taken together twice daily for 5 days. 30 Each 0   • lisinopril (PRINIVIL) 10 MG Tab Take 1 Tablet by mouth every day. 90 Tablet 3   • Omega-3 Fatty Acids (FISH OIL) 1000 MG Cap capsule Take 1,000 mg by mouth 2 Times a Day. After meals     • Probiotic Product (PROBIOTIC-10 PO) Take  by mouth.       No current facility-administered medications for this visit.       Patient Active Problem List    Diagnosis Date Noted   • Osteopenia of multiple sites  "05/13/2022   • Essential hypertension 01/29/2021   • Obstructive sleep apnea syndrome 01/29/2021   • History of colon polyps 01/29/2021   • External hemorrhoid 01/29/2021   • SOB (shortness of breath) on exertion 01/29/2021        Objective:   /85 (BP Location: Left arm, Patient Position: Sitting, BP Cuff Size: Adult)   Pulse 80   Temp 36.9 °C (98.4 °F) (Tympanic)   Ht 1.626 m (5' 4\")   Wt 75.8 kg (167 lb)   SpO2 93%   BMI 28.67 kg/m²     Physical Exam:  Constitutional: Alert, no distress, well-groomed.  Skin: No rashes in visible areas.  Eye: Round. Conjunctiva clear, lids normal. No icterus.   ENMT: Lips pink without lesions, good dentition, moist mucous membranes. Phonation normal.  Neck: No masses, no thyromegaly. Moves freely without pain.  Respiratory: Unlabored respiratory effort, no cough or audible wheeze  Psych: Alert and oriented x3, normal affect and mood.     Assessment and Plan:   The following treatment plan was discussed:     1. COVID-19 virus infection  Patient presentation consistent with COVID-19 infection.  Patient presents with positive COVID test. Discussed use Paxlovid antiviral treatment's potential to reduce risk of hospitalization and mortality. Side effects are diarrhea, hypertension, and muscle aches, altered taste discussed. Patient agreeable to this.   Advised patient to check BP readings at home and keep log.  Patient to report for consistent BP >140/90.  Recommend supportive measures including humidifier, warm salt water gargles, over-the-counter Cepacol throat lozenges, rest  and increased fluids.   May take OTC immune booster supplementation that contains Vitamin C, D, E, zinc, B vitamins.  Recommend to take Mucinex as needed during the day and use throat lozenges such as Ricola. Use dextromethorphan for cough only at night to allow the body to expel the pathogen during the day.  Take 5-10 deep breaths intermittently throughout the day and move the body as tolerated to " aid in respiration.  Patient was encouraged to seek treatment in the ER or urgent care for worsening symptoms, fever greater than 100.5, wheezes, shortness of breath, dyspnea, or syncope.    - Nirmatrelvir & Ritonavir 20 x 150 MG & 10 x 100MG Tablet Therapy Pack; Take 300 mg nirmatrelvir (two 150 mg tablets) with 100 mg ritonavir (one 100 mg tablet) by mouth, with all three tablets taken together twice daily for 5 days.  Dispense: 30 Each; Refill: 0    Follow-up: Return if symptoms worsen or fail to improve.

## 2022-08-19 ENCOUNTER — HOSPITAL ENCOUNTER (OUTPATIENT)
Facility: MEDICAL CENTER | Age: 67
End: 2022-08-19
Attending: CLINICAL NURSE SPECIALIST
Payer: MEDICARE

## 2022-08-19 ENCOUNTER — OFFICE VISIT (OUTPATIENT)
Dept: MEDICAL GROUP | Facility: IMAGING CENTER | Age: 67
End: 2022-08-19
Payer: MEDICARE

## 2022-08-19 VITALS
SYSTOLIC BLOOD PRESSURE: 128 MMHG | WEIGHT: 116.4 LBS | OXYGEN SATURATION: 97 % | BODY MASS INDEX: 19.87 KG/M2 | TEMPERATURE: 98.4 F | HEART RATE: 79 BPM | HEIGHT: 64 IN | DIASTOLIC BLOOD PRESSURE: 78 MMHG | RESPIRATION RATE: 14 BRPM

## 2022-08-19 DIAGNOSIS — R19.7 DIARRHEA OF PRESUMED INFECTIOUS ORIGIN: ICD-10-CM

## 2022-08-19 DIAGNOSIS — R39.15 URINARY URGENCY: ICD-10-CM

## 2022-08-19 DIAGNOSIS — N30.01 ACUTE CYSTITIS WITH HEMATURIA: ICD-10-CM

## 2022-08-19 DIAGNOSIS — J02.9 SORE THROAT: ICD-10-CM

## 2022-08-19 LAB
APPEARANCE UR: CLEAR
BILIRUB UR STRIP-MCNC: NEGATIVE MG/DL
COLOR UR AUTO: YELLOW
GLUCOSE UR STRIP.AUTO-MCNC: NEGATIVE MG/DL
INT CON NEG: NORMAL
INT CON POS: NORMAL
KETONES UR STRIP.AUTO-MCNC: NEGATIVE MG/DL
LEUKOCYTE ESTERASE UR QL STRIP.AUTO: NORMAL
NITRITE UR QL STRIP.AUTO: NEGATIVE
PH UR STRIP.AUTO: 7 [PH] (ref 5–8)
PROT UR QL STRIP: NEGATIVE MG/DL
RBC UR QL AUTO: NORMAL
S PYO AG THROAT QL: NEGATIVE
SP GR UR STRIP.AUTO: 1.01
UROBILINOGEN UR STRIP-MCNC: 0.2 MG/DL

## 2022-08-19 PROCEDURE — 87086 URINE CULTURE/COLONY COUNT: CPT

## 2022-08-19 PROCEDURE — 81002 URINALYSIS NONAUTO W/O SCOPE: CPT | Performed by: CLINICAL NURSE SPECIALIST

## 2022-08-19 PROCEDURE — 87186 SC STD MICRODIL/AGAR DIL: CPT

## 2022-08-19 PROCEDURE — 99214 OFFICE O/P EST MOD 30 MIN: CPT | Performed by: CLINICAL NURSE SPECIALIST

## 2022-08-19 PROCEDURE — 87077 CULTURE AEROBIC IDENTIFY: CPT

## 2022-08-19 PROCEDURE — U0003 INFECTIOUS AGENT DETECTION BY NUCLEIC ACID (DNA OR RNA); SEVERE ACUTE RESPIRATORY SYNDROME CORONAVIRUS 2 (SARS-COV-2) (CORONAVIRUS DISEASE [COVID-19]), AMPLIFIED PROBE TECHNIQUE, MAKING USE OF HIGH THROUGHPUT TECHNOLOGIES AS DESCRIBED BY CMS-2020-01-R: HCPCS

## 2022-08-19 PROCEDURE — 87070 CULTURE OTHR SPECIMN AEROBIC: CPT | Mod: XU

## 2022-08-19 PROCEDURE — U0005 INFEC AGEN DETEC AMPLI PROBE: HCPCS

## 2022-08-19 PROCEDURE — 87880 STREP A ASSAY W/OPTIC: CPT | Performed by: CLINICAL NURSE SPECIALIST

## 2022-08-19 RX ORDER — NITROFURANTOIN 25; 75 MG/1; MG/1
100 CAPSULE ORAL 2 TIMES DAILY
Qty: 14 CAPSULE | Refills: 0 | Status: SHIPPED | OUTPATIENT
Start: 2022-08-19 | End: 2023-06-28

## 2022-08-19 ASSESSMENT — FIBROSIS 4 INDEX: FIB4 SCORE: 1.49

## 2022-08-19 ASSESSMENT — PAIN SCALES - GENERAL: PAINLEVEL: NO PAIN

## 2022-08-19 NOTE — PROGRESS NOTES
"Subjective     Telma Vergara is a 66 y.o. female who presents with Bladder Problem (Frequency urination, pressure, discomfort x4days )            HPI  Urinary urgency and irritation in bladder area x4 days. Back pain 3 days ago, none now.  Watery diarrhea today.  Chills last night.  Sore throat for one week with sneezing.  Pain with swallowing occasionally but not eating.  No n/v or cough. No myalgia or arthralgia.  Home COVID test negative yesterday    ROS  See HPI     No Known Allergies    Current Outpatient Medications on File Prior to Visit   Medication Sig Dispense Refill    lisinopril (PRINIVIL) 10 MG Tab Take 1 Tablet by mouth every day. 90 Tablet 3    Omega-3 Fatty Acids (FISH OIL) 1000 MG Cap capsule Take 1,000 mg by mouth 2 Times a Day. After meals      Probiotic Product (PROBIOTIC-10 PO) Take  by mouth.      Nirmatrelvir & Ritonavir 20 x 150 MG & 10 x 100MG Tablet Therapy Pack Take 300 mg nirmatrelvir (two 150 mg tablets) with 100 mg ritonavir (one 100 mg tablet) by mouth, with all three tablets taken together twice daily for 5 days. (Patient not taking: Reported on 8/19/2022) 30 Each 0     No current facility-administered medications on file prior to visit.           Objective     /78 (BP Location: Left arm, Patient Position: Sitting, BP Cuff Size: Adult)   Pulse 79   Temp 36.9 °C (98.4 °F) (Temporal)   Resp 14   Ht 1.626 m (5' 4\")   Wt 52.8 kg (116 lb 6.4 oz)   SpO2 97%   BMI 19.98 kg/m²      Physical Exam  Constitutional:       General: She is not in acute distress.     Appearance: Normal appearance. She is not ill-appearing, toxic-appearing or diaphoretic.   HENT:      Head: Normocephalic and atraumatic.      Mouth/Throat:      Mouth: Mucous membranes are moist.      Pharynx: Posterior oropharyngeal erythema (mild on uvula) present. No oropharyngeal exudate.   Eyes:      General: No scleral icterus.     Extraocular Movements: Extraocular movements intact.      Pupils: Pupils are equal, " round, and reactive to light.   Cardiovascular:      Rate and Rhythm: Normal rate and regular rhythm.      Heart sounds: Normal heart sounds.   Pulmonary:      Effort: Pulmonary effort is normal.      Breath sounds: Normal breath sounds. No wheezing.   Abdominal:      General: Bowel sounds are normal. There is no distension.      Palpations: Abdomen is soft.      Tenderness: There is no abdominal tenderness. There is no right CVA tenderness, left CVA tenderness or rebound.   Lymphadenopathy:      Cervical: Cervical adenopathy (submandibular) present.   Skin:     General: Skin is warm and dry.   Neurological:      Mental Status: She is alert and oriented to person, place, and time.      Gait: Gait normal.   Psychiatric:         Mood and Affect: Mood normal.         Behavior: Behavior normal.         Thought Content: Thought content normal.         Judgment: Judgment normal.                           Assessment & Plan     1. Urinary urgency  Urinalysis with hematuria.  She will be treated for acute cystitis.  - POCT Urinalysis  - URINE CULTURE(NEW); Future    2. Acute cystitis with hematuria  Telma is to take nitrofurantoin 100 mg twice a day for 7 days.  Urine will be sent for culture.  She is to continue to hydrate well.    - nitrofurantoin (MACROBID) 100 MG Cap; Take 1 Capsule by mouth 2 times a day.  Dispense: 14 Capsule; Refill: 0  -Urine culture    3. Sore throat  Point-of-care strep test negative.  She likely has a viral illness that is concurrent with her urinary tract infection.  Throat culture and COVID PCR sent.    - POCT Rapid Strep A- negative  - COVID/SARS CoV-2 PCR; Future  -Throat culture    4. Diarrhea of presumed infectious origin  Likely of viral origin.  She will continue to monitor.  Supportive care.    - COVID/SARS CoV-2 PCR; Future    Return if symptoms worsen or fail to improve, for With test results.

## 2022-08-20 DIAGNOSIS — J02.9 SORE THROAT: ICD-10-CM

## 2022-08-20 DIAGNOSIS — R39.15 URINARY URGENCY: ICD-10-CM

## 2022-08-20 DIAGNOSIS — R19.7 DIARRHEA OF PRESUMED INFECTIOUS ORIGIN: ICD-10-CM

## 2022-08-21 LAB
COVID ORDER STATUS COVID19: NORMAL
SARS-COV-2 RNA RESP QL NAA+PROBE: NOTDETECTED
SPECIMEN SOURCE: NORMAL

## 2022-08-22 LAB
BACTERIA SPEC RESP CULT: NORMAL
BACTERIA UR CULT: ABNORMAL
BACTERIA UR CULT: ABNORMAL
SIGNIFICANT IND 70042: ABNORMAL
SIGNIFICANT IND 70042: NORMAL
SITE SITE: ABNORMAL
SITE SITE: NORMAL
SOURCE SOURCE: ABNORMAL
SOURCE SOURCE: NORMAL

## 2022-08-25 ENCOUNTER — APPOINTMENT (RX ONLY)
Dept: URBAN - METROPOLITAN AREA CLINIC 36 | Facility: CLINIC | Age: 67
Setting detail: DERMATOLOGY
End: 2022-08-25

## 2022-08-25 DIAGNOSIS — Z41.9 ENCOUNTER FOR PROCEDURE FOR PURPOSES OTHER THAN REMEDYING HEALTH STATE, UNSPECIFIED: ICD-10-CM

## 2022-08-25 PROCEDURE — ? COSMETIC CONSULTATION: LASER RESURFACING

## 2022-08-25 PROCEDURE — ? COSMETIC CONSULTATION: FILLERS

## 2022-08-25 PROCEDURE — ? ADDITIONAL NOTES

## 2022-08-25 PROCEDURE — ? COSMETIC CONSULTATION: CLEAR AND BRILLIANT

## 2022-08-25 PROCEDURE — ? COSMETIC CONSULTATION: BOTOX

## 2022-08-25 NOTE — HPI: COSMETIC CONSULTATION
When Outside In The Sun, Do You...: never burns, always tans
Additional History: Active person during the summer. Has more downtime in the winter.
AMS/confusion

## 2022-09-15 ENCOUNTER — RX ONLY (OUTPATIENT)
Age: 67
Setting detail: RX ONLY
End: 2022-09-15

## 2022-09-15 ENCOUNTER — APPOINTMENT (RX ONLY)
Dept: URBAN - METROPOLITAN AREA CLINIC 36 | Facility: CLINIC | Age: 67
Setting detail: DERMATOLOGY
End: 2022-09-15

## 2022-09-15 DIAGNOSIS — L98.8 OTHER SPECIFIED DISORDERS OF THE SKIN AND SUBCUTANEOUS TISSUE: ICD-10-CM

## 2022-09-15 DIAGNOSIS — Z41.9 ENCOUNTER FOR PROCEDURE FOR PURPOSES OTHER THAN REMEDYING HEALTH STATE, UNSPECIFIED: ICD-10-CM

## 2022-09-15 DIAGNOSIS — L73.8 OTHER SPECIFIED FOLLICULAR DISORDERS: ICD-10-CM

## 2022-09-15 PROCEDURE — ? BOTOX

## 2022-09-15 PROCEDURE — ? PULSED-DYE LASER

## 2022-09-15 PROCEDURE — ? FILLERS

## 2022-09-15 PROCEDURE — ? BENIGN DESTRUCTION COSMETIC MULTI

## 2022-09-15 RX ORDER — ACYCLOVIR 400 MG/1
TABLET ORAL
Qty: 30 | Refills: 3 | Status: ERX | COMMUNITY
Start: 2022-09-15

## 2022-09-15 ASSESSMENT — LOCATION DETAILED DESCRIPTION DERM
LOCATION DETAILED: RIGHT CENTRAL MALAR CHEEK
LOCATION DETAILED: NASAL SUPRATIP
LOCATION DETAILED: RIGHT INFERIOR VERMILION LIP
LOCATION DETAILED: RIGHT INFERIOR MEDIAL FOREHEAD
LOCATION DETAILED: RIGHT INFERIOR CENTRAL MALAR CHEEK

## 2022-09-15 ASSESSMENT — LOCATION SIMPLE DESCRIPTION DERM
LOCATION SIMPLE: RIGHT CHEEK
LOCATION SIMPLE: NOSE
LOCATION SIMPLE: RIGHT FOREHEAD
LOCATION SIMPLE: RIGHT LIP

## 2022-09-15 ASSESSMENT — LOCATION ZONE DERM
LOCATION ZONE: FACE
LOCATION ZONE: LIP
LOCATION ZONE: NOSE

## 2022-09-15 NOTE — PROCEDURE: BOTOX
Glabellar Complex Units: 15
Additional Area 1 Location: upper lip
Show Orbicularis Oculi Units: Yes
R Brow Units: 0
Show Mentalis Units: No
Detail Level: Detailed
Additional Area 2 Location: chin
Expiration Date (Month Year): 04/2025
Periorbital Skin Units: 18
Additional Area 3 Location: masseter
Price (Use Numbers Only, No Special Characters Or $): 0.00
Lot #: [id number]
Anterior Platysmal Bands Units: 12
Consent: Written consent obtained. Risks include but not limited to lid/brow ptosis, bruising, swelling, diplopia, temporary effect, incomplete chemical denervation.
Dilution (U/0.1 Cc): 0.6
Post-Care Instructions: Patient instructed to not lie down for 4 hours and limit physical activity for 24 hours. Patient instructed not to travel by airplane for 48 hours.
Forehead Units: 6

## 2022-09-15 NOTE — PROCEDURE: FILLERS
Tear Troughs Filler Volume In Cc: 0
Post-Care Instructions: Patient instructed to apply ice to reduce swelling.
Nasolabial Folds Filler Volume In Cc: 0.8
Include Cannula Information In Note?: No
Marionette Lines Filler Volume In Cc: 0.2
Lot #: [id number]
Anesthesia Type: 1% lidocaine with epinephrine
Anesthesia Volume In Cc: 0.5
Detail Level: Detailed
Expiration Date (Month Year): 07/01/2023
Map Statment: See Attach Map for Complete Details
Include Cannula Brand?: DermaSculpt
Additional Anesthesia Volume In Cc: 6
Additional Area 1 Location: glabella with cannula
Lot #: [id number]
Include Cannula Size?: 25G
Aspiration Statement: Aspiration was performed prior to injecting site with filler.
Additional Area 2 Location: facial scars right cheek
Expiration Date (Month Year): 6/4/2020
Filler: Juvederm Ultra
Include Cannula Length?: 1.5 inch
Consent: Written consent obtained. Risks include but not limited to bruising, beading, irregular texture, ulceration, infection, allergic reaction, scar formation, incomplete augmentation, temporary nature, procedural pain.

## 2022-09-15 NOTE — PROCEDURE: PULSED-DYE LASER
Cryogen Time (Ms): 30
Immediate Endpoint: purpura
Consent: Written consent obtained, risks reviewed including but not limited to crusting, scabbing, blistering, scarring, darker or lighter pigmentary change, incidental hair removal, bruising, and/or incomplete removal.
Pulse Duration: 10 ms
Delay Time (Ms): 20
Post-Procedure Care: Vaseline and ice applied. Post care reviewed with patient.
Detail Level: Zone
Spot Size: 7 mm
Pulse Duration: 3 ms
Fluence In J/Cm2 (Optional): 6
Treated Area: small area
Laser Type: Vbeam 595nm
Post-Care Instructions: I reviewed with the patient in detail post-care instructions. Patient should stay away from the sun and wear sun protection until treated areas are fully healed.

## 2022-11-07 ENCOUNTER — PATIENT MESSAGE (OUTPATIENT)
Dept: HEALTH INFORMATION MANAGEMENT | Facility: OTHER | Age: 67
End: 2022-11-07

## 2022-12-20 ENCOUNTER — RESEARCH ENCOUNTER (OUTPATIENT)
Dept: MEDICAL GROUP | Facility: PHYSICIAN GROUP | Age: 67
End: 2022-12-20
Payer: MEDICARE

## 2022-12-20 ENCOUNTER — OFFICE VISIT (OUTPATIENT)
Dept: MEDICAL GROUP | Facility: IMAGING CENTER | Age: 67
End: 2022-12-20
Payer: MEDICARE

## 2022-12-20 VITALS
WEIGHT: 116.4 LBS | HEART RATE: 77 BPM | SYSTOLIC BLOOD PRESSURE: 132 MMHG | HEIGHT: 64 IN | BODY MASS INDEX: 19.87 KG/M2 | OXYGEN SATURATION: 95 % | RESPIRATION RATE: 16 BRPM | DIASTOLIC BLOOD PRESSURE: 72 MMHG | TEMPERATURE: 97 F

## 2022-12-20 DIAGNOSIS — Z00.6 RESEARCH STUDY PATIENT: ICD-10-CM

## 2022-12-20 DIAGNOSIS — R35.0 URINARY FREQUENCY: ICD-10-CM

## 2022-12-20 DIAGNOSIS — R39.9 UTI SYMPTOMS: ICD-10-CM

## 2022-12-20 LAB
APPEARANCE UR: NORMAL
BILIRUB UR STRIP-MCNC: NORMAL MG/DL
COLOR UR AUTO: NORMAL
GLUCOSE UR STRIP.AUTO-MCNC: NORMAL MG/DL
KETONES UR STRIP.AUTO-MCNC: NORMAL MG/DL
LEUKOCYTE ESTERASE UR QL STRIP.AUTO: NORMAL
NITRITE UR QL STRIP.AUTO: NORMAL
PH UR STRIP.AUTO: 6 [PH] (ref 5–8)
PROT UR QL STRIP: NORMAL MG/DL
RBC UR QL AUTO: NORMAL
SP GR UR STRIP.AUTO: 1.02
UROBILINOGEN UR STRIP-MCNC: 0.2 MG/DL

## 2022-12-20 PROCEDURE — 99213 OFFICE O/P EST LOW 20 MIN: CPT

## 2022-12-20 PROCEDURE — 81002 URINALYSIS NONAUTO W/O SCOPE: CPT

## 2022-12-20 ASSESSMENT — FIBROSIS 4 INDEX: FIB4 SCORE: 1.49

## 2022-12-20 NOTE — PROGRESS NOTES
Subjective:     CC:   Chief Complaint   Patient presents with    UTI     Re- test       HPI:   Telma presents today to discuss:    UTI follow up  Patient was recently treated for UTI on 12/15 with Macrobid which she has completed. Her symptoms have improved. She is no longer having dysuria, nausea, urgency, and fatigue. However, she continues to have urinary frequency.  She denies hematuria, urinary incontinence, pelvic or flank pain.        Past Medical History:   Diagnosis Date    Hypertension     Sleep apnea 01/01/2012     Family History   Problem Relation Age of Onset    Heart Disease Mother     Hypertension Mother     COPD Father     Other Father     Kidney Disease Father     Hyperlipidemia Father     Alcohol abuse Father      Past Surgical History:   Procedure Laterality Date    EYE SURGERY  2016    eye lens implants    BUNIONECTOMY Right 2015    CHOLECYSTECTOMY  2013    LUMPECTOMY  2004,2014    breast implants and revision    LA BREAST AUGMENTATION WITH IMPLANT      PRIMARY C SECTION      1989     Social History     Tobacco Use    Smoking status: Never    Smokeless tobacco: Never   Vaping Use    Vaping Use: Never used   Substance Use Topics    Alcohol use: Yes     Alcohol/week: 1.8 oz     Types: 3 Standard drinks or equivalent per week     Comment: 2 - 3 glasses 4 days a week    Drug use: Never     Social History     Social History Narrative    Not on file     Current Outpatient Medications Ordered in Epic   Medication Sig Dispense Refill    nitrofurantoin (MACROBID) 100 MG Cap Take 1 Capsule by mouth 2 times a day. 14 Capsule 0    lisinopril (PRINIVIL) 10 MG Tab Take 1 Tablet by mouth every day. 90 Tablet 3    Omega-3 Fatty Acids (FISH OIL) 1000 MG Cap capsule Take 1,000 mg by mouth 2 Times a Day. After meals      Probiotic Product (PROBIOTIC-10 PO) Take  by mouth.      Nirmatrelvir & Ritonavir 20 x 150 MG & 10 x 100MG Tablet Therapy Pack Take 300 mg nirmatrelvir (two 150 mg tablets) with 100 mg ritonavir  "(one 100 mg tablet) by mouth, with all three tablets taken together twice daily for 5 days. (Patient not taking: Reported on 8/19/2022) 30 Each 0     No current [facility]-ordered facility-administered medications on file.     Patient has no known allergies.    ROS: see hpi  Gen: no fevers/chills  Pulm: no sob, no cough  CV: no chest pain, no palpitations, no edema  GI: no nausea/vomiting, no diarrhea  Skin: no rash    Objective:   Exam:  /72 (BP Location: Left arm, Patient Position: Sitting, BP Cuff Size: Adult)   Pulse 77   Temp 36.1 °C (97 °F) (Temporal)   Resp 16   Ht 1.626 m (5' 4\")   Wt 52.8 kg (116 lb 6.4 oz)   SpO2 95%   BMI 19.98 kg/m²    Body mass index is 19.98 kg/m².    Gen: Alert and oriented, No apparent distress.  HEENT: Head atraumatic, normocephalic. Pupils equal and round.  Neck: Neck is supple without lymphadenopathy.   Lungs: Normal effort, CTA bilaterally, no wheezes, rhonchi, or rales  CV: Regular rate and rhythm. No murmurs, rubs, or gallops.  ABD: +BS. Non-tender, non-distended. No rebound, rigidity, or guarding.  Ext: No clubbing, cyanosis, edema.    Assessment & Plan:     66 y.o. female with the following -     1. UTI symptoms  2. Urinary frequency  Established and stable condition.  Patient recently completed Macrobid regimen for UTI.  However, she continues to have urinary frequency.  Urinalysis negative for UTI in office.  Differentials include overactive bladder versus interstitial cystitis.  Recommend lifestyle modification:  -avoid diuretics such as caffeine  -avoid constipation, increase fiber intake  -avoid fluid intake close to bedtime  -pelvic floor and kegel exercises  -start pre/probiotic supplementation  -D-mannose, vitamin C, oregano oil supplementation for bladder irritation  For worsening symptoms, will refer to urology.    - POCT Urinalysis    Medical Decision Making/Course:  In the course of preparing for this visit with review of the pertinent past medical history, " recent and past clinic visits, current medications, and performing chart, immunization, medical history and medication reconciliation, and in the further course of obtaining the current history pertinent to the clinic visit today, performing an exam and evaluation, ordering and independently evaluating labs, tests, and/or procedures, prescribing any recommended new medications as noted above, providing any pertinent counseling and education and recommending further coordination of care. This was discussed with patient in a shared-decision making conversation, and they understand and agreed with plan of care.     Return if symptoms worsen or fail to improve.    KARL Tejada   [facility]    Please note that this dictation was created using voice recognition software. I have made every reasonable attempt to correct obvious errors, but I expect that there are errors of grammar and possibly content that I did not discover before finalizing the note.

## 2023-01-27 ENCOUNTER — HOSPITAL ENCOUNTER (OUTPATIENT)
Facility: MEDICAL CENTER | Age: 68
End: 2023-01-27
Attending: PATHOLOGY
Payer: COMMERCIAL

## 2023-01-30 DIAGNOSIS — Z00.6 RESEARCH STUDY PATIENT: ICD-10-CM

## 2023-02-07 LAB
ELF SCORE: 9.1
RELATIVE RISK: NORMAL
RISK GROUP: NORMAL
RISK: 3.3 %

## 2023-02-12 LAB
APOB+LDLR+PCSK9 GENE MUT ANL BLD/T: NOT DETECTED
BRCA1+BRCA2 DEL+DUP + FULL MUT ANL BLD/T: NOT DETECTED
MLH1+MSH2+MSH6+PMS2 GN DEL+DUP+FUL M: NOT DETECTED

## 2023-06-27 SDOH — ECONOMIC STABILITY: HOUSING INSECURITY: IN THE LAST 12 MONTHS, HOW MANY PLACES HAVE YOU LIVED?: 1

## 2023-06-27 SDOH — ECONOMIC STABILITY: INCOME INSECURITY: IN THE LAST 12 MONTHS, WAS THERE A TIME WHEN YOU WERE NOT ABLE TO PAY THE MORTGAGE OR RENT ON TIME?: NO

## 2023-06-27 SDOH — HEALTH STABILITY: PHYSICAL HEALTH: ON AVERAGE, HOW MANY MINUTES DO YOU ENGAGE IN EXERCISE AT THIS LEVEL?: 90 MIN

## 2023-06-27 SDOH — HEALTH STABILITY: PHYSICAL HEALTH: ON AVERAGE, HOW MANY DAYS PER WEEK DO YOU ENGAGE IN MODERATE TO STRENUOUS EXERCISE (LIKE A BRISK WALK)?: 7 DAYS

## 2023-06-27 SDOH — ECONOMIC STABILITY: FOOD INSECURITY: WITHIN THE PAST 12 MONTHS, THE FOOD YOU BOUGHT JUST DIDN'T LAST AND YOU DIDN'T HAVE MONEY TO GET MORE.: NEVER TRUE

## 2023-06-27 SDOH — ECONOMIC STABILITY: INCOME INSECURITY: HOW HARD IS IT FOR YOU TO PAY FOR THE VERY BASICS LIKE FOOD, HOUSING, MEDICAL CARE, AND HEATING?: NOT HARD AT ALL

## 2023-06-27 SDOH — ECONOMIC STABILITY: FOOD INSECURITY: WITHIN THE PAST 12 MONTHS, YOU WORRIED THAT YOUR FOOD WOULD RUN OUT BEFORE YOU GOT MONEY TO BUY MORE.: NEVER TRUE

## 2023-06-27 ASSESSMENT — SOCIAL DETERMINANTS OF HEALTH (SDOH)
HOW OFTEN DO YOU ATTENT MEETINGS OF THE CLUB OR ORGANIZATION YOU BELONG TO?: NEVER
HOW MANY DRINKS CONTAINING ALCOHOL DO YOU HAVE ON A TYPICAL DAY WHEN YOU ARE DRINKING: 1 OR 2
HOW OFTEN DO YOU HAVE SIX OR MORE DRINKS ON ONE OCCASION: NEVER
DO YOU BELONG TO ANY CLUBS OR ORGANIZATIONS SUCH AS CHURCH GROUPS UNIONS, FRATERNAL OR ATHLETIC GROUPS, OR SCHOOL GROUPS?: NO
HOW OFTEN DO YOU ATTENT MEETINGS OF THE CLUB OR ORGANIZATION YOU BELONG TO?: NEVER
HOW OFTEN DO YOU GET TOGETHER WITH FRIENDS OR RELATIVES?: TWICE A WEEK
HOW OFTEN DO YOU GET TOGETHER WITH FRIENDS OR RELATIVES?: TWICE A WEEK
IN A TYPICAL WEEK, HOW MANY TIMES DO YOU TALK ON THE PHONE WITH FAMILY, FRIENDS, OR NEIGHBORS?: THREE TIMES A WEEK
HOW OFTEN DO YOU HAVE A DRINK CONTAINING ALCOHOL: 2-3 TIMES A WEEK
DO YOU BELONG TO ANY CLUBS OR ORGANIZATIONS SUCH AS CHURCH GROUPS UNIONS, FRATERNAL OR ATHLETIC GROUPS, OR SCHOOL GROUPS?: NO
HOW OFTEN DO YOU ATTEND CHURCH OR RELIGIOUS SERVICES?: 1 TO 4 TIMES PER YEAR
HOW HARD IS IT FOR YOU TO PAY FOR THE VERY BASICS LIKE FOOD, HOUSING, MEDICAL CARE, AND HEATING?: NOT HARD AT ALL
HOW OFTEN DO YOU ATTEND CHURCH OR RELIGIOUS SERVICES?: 1 TO 4 TIMES PER YEAR
WITHIN THE PAST 12 MONTHS, YOU WORRIED THAT YOUR FOOD WOULD RUN OUT BEFORE YOU GOT THE MONEY TO BUY MORE: NEVER TRUE
IN A TYPICAL WEEK, HOW MANY TIMES DO YOU TALK ON THE PHONE WITH FAMILY, FRIENDS, OR NEIGHBORS?: THREE TIMES A WEEK

## 2023-06-27 ASSESSMENT — LIFESTYLE VARIABLES
HOW OFTEN DO YOU HAVE SIX OR MORE DRINKS ON ONE OCCASION: NEVER
HOW OFTEN DO YOU HAVE A DRINK CONTAINING ALCOHOL: 2-3 TIMES A WEEK
AUDIT-C TOTAL SCORE: 3
HOW MANY STANDARD DRINKS CONTAINING ALCOHOL DO YOU HAVE ON A TYPICAL DAY: 1 OR 2
SKIP TO QUESTIONS 9-10: 1

## 2023-06-28 ENCOUNTER — OFFICE VISIT (OUTPATIENT)
Dept: MEDICAL GROUP | Facility: IMAGING CENTER | Age: 68
End: 2023-06-28
Payer: MEDICARE

## 2023-06-28 VITALS
DIASTOLIC BLOOD PRESSURE: 80 MMHG | RESPIRATION RATE: 17 BRPM | TEMPERATURE: 97.6 F | SYSTOLIC BLOOD PRESSURE: 120 MMHG | HEART RATE: 66 BPM | WEIGHT: 115.2 LBS | HEIGHT: 64 IN | OXYGEN SATURATION: 96 % | BODY MASS INDEX: 19.67 KG/M2

## 2023-06-28 DIAGNOSIS — R92.30 DENSE BREAST TISSUE ON MAMMOGRAM: ICD-10-CM

## 2023-06-28 DIAGNOSIS — Z00.00 MEDICARE ANNUAL WELLNESS VISIT, SUBSEQUENT: ICD-10-CM

## 2023-06-28 DIAGNOSIS — E55.9 VITAMIN D INSUFFICIENCY: ICD-10-CM

## 2023-06-28 DIAGNOSIS — Z13.1 SCREENING FOR DIABETES MELLITUS: ICD-10-CM

## 2023-06-28 DIAGNOSIS — I10 ESSENTIAL HYPERTENSION: ICD-10-CM

## 2023-06-28 DIAGNOSIS — R06.02 SOB (SHORTNESS OF BREATH) ON EXERTION: ICD-10-CM

## 2023-06-28 DIAGNOSIS — R68.82 DECREASED LIBIDO: ICD-10-CM

## 2023-06-28 DIAGNOSIS — M85.89 OSTEOPENIA OF MULTIPLE SITES: ICD-10-CM

## 2023-06-28 DIAGNOSIS — Z12.39 ENCOUNTER FOR SCREENING FOR MALIGNANT NEOPLASM OF BREAST, UNSPECIFIED SCREENING MODALITY: ICD-10-CM

## 2023-06-28 DIAGNOSIS — K64.4 EXTERNAL HEMORRHOID: ICD-10-CM

## 2023-06-28 DIAGNOSIS — Z86.010 HISTORY OF COLON POLYPS: ICD-10-CM

## 2023-06-28 DIAGNOSIS — Z13.220 SCREENING, LIPID: ICD-10-CM

## 2023-06-28 DIAGNOSIS — R41.3 MEMORY PROBLEM: ICD-10-CM

## 2023-06-28 DIAGNOSIS — G47.33 OBSTRUCTIVE SLEEP APNEA SYNDROME: ICD-10-CM

## 2023-06-28 DIAGNOSIS — Z23 NEED FOR PNEUMOCOCCAL VACCINATION: ICD-10-CM

## 2023-06-28 PROCEDURE — 90677 PCV20 VACCINE IM: CPT | Performed by: FAMILY MEDICINE

## 2023-06-28 PROCEDURE — 3074F SYST BP LT 130 MM HG: CPT | Performed by: FAMILY MEDICINE

## 2023-06-28 PROCEDURE — 3079F DIAST BP 80-89 MM HG: CPT | Performed by: FAMILY MEDICINE

## 2023-06-28 PROCEDURE — G0009 ADMIN PNEUMOCOCCAL VACCINE: HCPCS | Performed by: FAMILY MEDICINE

## 2023-06-28 PROCEDURE — 1126F AMNT PAIN NOTED NONE PRSNT: CPT | Performed by: FAMILY MEDICINE

## 2023-06-28 PROCEDURE — G0439 PPPS, SUBSEQ VISIT: HCPCS | Mod: 25 | Performed by: FAMILY MEDICINE

## 2023-06-28 RX ORDER — MULTIVIT-MIN/IRON/FOLIC ACID/K 18-600-40
CAPSULE ORAL
COMMUNITY
End: 2023-09-21

## 2023-06-28 RX ORDER — LISINOPRIL 10 MG/1
10 TABLET ORAL DAILY
Qty: 90 TABLET | Refills: 3 | Status: SHIPPED | OUTPATIENT
Start: 2023-06-28

## 2023-06-28 ASSESSMENT — FIBROSIS 4 INDEX: FIB4 SCORE: 1.51

## 2023-06-28 ASSESSMENT — PATIENT HEALTH QUESTIONNAIRE - PHQ9: CLINICAL INTERPRETATION OF PHQ2 SCORE: 0

## 2023-06-28 ASSESSMENT — PAIN SCALES - GENERAL: PAINLEVEL: NO PAIN

## 2023-06-28 ASSESSMENT — ENCOUNTER SYMPTOMS: GENERAL WELL-BEING: GOOD

## 2023-06-28 ASSESSMENT — ACTIVITIES OF DAILY LIVING (ADL): BATHING_REQUIRES_ASSISTANCE: 0

## 2023-06-28 NOTE — PROGRESS NOTES
Chief Complaint   Patient presents with    Annual Exam       HPI:  Telma is a 67 y.o. here for Medicare Annual Wellness Visit  Hypertension-lisinopril 10 mg daily. Trying to stop caffeine thus slight headache today. Wondering about stopping alcohol. BP home 120/82.   Obstructive sleep apnea- on cpap. Stable.   History of colon polyps and hemorrhoids. Colonoscopy up to date 2021. Sometimes feels lack of emptying completely. Eating prunes, more loose stools. Taking something for cut, athletic greens. Bloating with greens, broccoli, etc. Kegels recommended.   Osteopenia-2021.  Vitamin D low in past. On supplements. Rotates supplements.   SOB on exertion- notes at higher elevations, limit 10,000 feet. breathing exercise helps.   Did a trip to [place].   Difficult to remember words at times. Past year has been more aware, thus notices more now. Currently without significant concern.   Concerns of sexual health, decreased libido, not sure about going on medications.   Last year last pap smear 2021.      Patient Active Problem List    Diagnosis Date Noted    Osteopenia of multiple sites 05/13/2022    Essential hypertension 01/29/2021    Obstructive sleep apnea syndrome 01/29/2021    History of colon polyps 01/29/2021    External hemorrhoid 01/29/2021    SOB (shortness of breath) on exertion 01/29/2021       Current Outpatient Medications   Medication Sig Dispense Refill    Cranberry-Vitamin C-Probiotic (AZO CRANBERRY PO) Take  by mouth.      Ascorbic Acid (VITAMIN C) 500 MG Cap Take  by mouth.      Non Formulary Request Athletic Greens      Non Formulary Request Roma Burger      Non Formulary Request Roma Aguayo      lisinopril (PRINIVIL) 10 MG Tab Take 1 Tablet by mouth every day. 90 Tablet 1    Omega-3 Fatty Acids (FISH OIL) 1000 MG Cap capsule Take 1,000 mg by mouth 2 Times a Day. After meals      Probiotic Product (PROBIOTIC-10 PO) Take  by mouth.      nitrofurantoin (MACROBID) 100 MG Cap Take 1  Capsule by mouth 2 times a day. 14 Capsule 0    Nirmatrelvir & Ritonavir 20 x 150 MG & 10 x 100MG Tablet Therapy Pack Take 300 mg nirmatrelvir (two 150 mg tablets) with 100 mg ritonavir (one 100 mg tablet) by mouth, with all three tablets taken together twice daily for 5 days. 30 Each 0     No current facility-administered medications for this visit.        Patient is taking medications as noted in medication list.  Current supplements as per medication list.     Allergies: Patient has no known allergies.    Current social contact/activities: Friends and family     Is patient current with immunizations? Yes.    She  reports that she has never smoked. She has never used smokeless tobacco. She reports current alcohol use of about 1.8 oz of alcohol per week. She reports that she does not use drugs.  Counseling given: Not Answered      ROS:    Gait: Uses no assistive device   Ostomy: No   Other tubes: No   Amputations: No   Chronic oxygen use No , pt does have CPAP machine   Last eye exam: August 2022  Wears hearing aids: No   : Denies any urinary leakage during the last 6 months    Depression Screening  Little interest or pleasure in doing things?  0 - not at all  Feeling down, depressed, or hopeless? 0 - not at all  Patient Health Questionnaire Score: 0    If depressive symptoms identified deferred to follow up visit unless specifically addressed in assessment and plan.    Interpretation of PHQ-9 Total Score   Score Severity   1-4 No Depression   5-9 Mild Depression   10-14 Moderate Depression   15-19 Moderately Severe Depression   20-27 Severe Depression    Screening for Cognitive Impairment  Three Minute Recall (daughter, heaven, mountain)  3/3    Yovanny clock face with all 12 numbers and set the hands to show 10 past 11.  Yes 5  If cognitive concerns identified, deferred for follow up unless specifically addressed in assessment and plan.    Fall Risk Assessment  Has the patient had two or more falls in the last  year or any fall with injury in the last year?  No  If fall risk identified, deferred for follow up unless specifically addressed in assessment and plan.    Safety Assessment  Throw rugs on floor.  Yes  Handrails on all stairs.  Yes  Good lighting in all hallways.  Yes  Difficulty hearing.  No  Patient counseled about all safety risks that were identified.    Functional Assessment ADLs  Are there any barriers preventing you from cooking for yourself or meeting nutritional needs?  No.    Are there any barriers preventing you from driving safely or obtaining transportation?  No.    Are there any barriers preventing you from using a telephone or calling for help?  No.    Are there any barriers preventing you from shopping?  No.    Are there any barriers preventing you from taking care of your own finances?  No.    Are there any barriers preventing you from managing your medications?  No.    Are there any barriers preventing you from showering, bathing or dressing yourself?  No.    Are you currently engaging in any exercise or physical activity?  Yes.  Weight strength conditioning 5 times a week. Walking 4 times a week 7 miles   What is your perception of your health?  Good.    Advance Care Planning  Do you have an Advance Directive, Living Will, Durable Power of , or POLST? Yes          is not on file - instructed patient to bring in a copy to scan into their chart    Health Maintenance Summary            Overdue - IMM ZOSTER VACCINES (1 of 2) Overdue - never done      No completion history exists for this topic.              Overdue - IMM PNEUMOCOCCAL VACCINE: 65+ Years (2 - PCV) Overdue since 10/1/2022      10/01/2021  Imm Admin: Pneumococcal polysaccharide vaccine (PPSV-23)              Overdue - COVID-19 Vaccine (5 - Moderna series) Overdue since 1/16/2023 09/16/2022  Imm Admin: PFIZER BIVALENT SARS-COV-2 VACCINE (12+)    11/17/2021  Imm Admin: MODERNA SARS-COV-2 VACCINE (12+)    03/10/2021  Imm  Admin: MODERNA SARS-COV-2 VACCINE (12+)    02/10/2021  Imm Admin: MODERNA SARS-COV-2 VACCINE (12+)              Overdue - Annual Wellness Visit (Every 366 Days) Overdue since 5/14/2023 05/13/2022  Visit Dx: Medicare annual wellness visit, initial              MAMMOGRAM (Every 2 Years) Next due on 5/13/2024 05/13/2022  MA-SCREENING MAMMO BILAT W/ IMPLANTS W/CAD    04/02/2021  MA-SCREENING MAMMO BILAT W/IMPLANTS W/SHARONDA W/CAD    03/10/2020  MA-DIAGNOSTIC MAMMO BILAT W/TOMOSYNTHESIS W/CAD    04/08/2019  MA-SCREENING MAMMO BILAT W/ IMPLANTS W/CAD              BONE DENSITY (Every 5 Years) Next due on 3/2/2026      03/02/2021  DS-BONE DENSITY STUDY (DEXA)    04/08/2019  DS-BONE DENSITY STUDY (DEXA)              IMM DTaP/Tdap/Td Vaccine (4 - Td or Tdap) Next due on 2/22/2030 02/22/2020  Imm Admin: Tdap Vaccine    09/01/2016  Imm Admin: Tdap Vaccine    07/01/2010  Imm Admin: Tdap Vaccine              COLORECTAL CANCER SCREENING (COLONOSCOPY - Every 10 Years) Tentatively due on 12/22/2031 12/22/2021  Referral to GI for Colonoscopy              HEPATITIS C SCREENING  Completed      02/25/2021  Hepatitis C Antibody component of HEP C VIRUS ANTIBODY              IMM INFLUENZA (Series Information) Completed      09/16/2022  Outside Immunization: Fluzone High-Dose Quad    10/01/2021  Imm Admin: Influenza Vaccine Adult HD    10/09/2020  Imm Admin: Influenza Vac Subunit Quad Inj (Pf)    10/04/2019  Imm Admin: Influenza Vaccine Quad Inj (Pf)    10/28/2018  Imm Admin: Influenza Vac Subunit Quad Inj (Pf)    Only the first 5 history entries have been loaded, but more history exists.              IMM HEP B VACCINE (Series Information) Aged Out      No completion history exists for this topic.              HPV Vaccines (Series Information) Aged Out      No completion history exists for this topic.              IMM MENINGOCOCCAL ACWY VACCINE (Series Information) Aged Out      No completion history exists for this topic.  "             Discontinued - CERVICAL CANCER SCREENING  Discontinued        Frequency changed to Never automatically (Topic No Longer Applies)    02/25/2021  THINPREP PAP WITH HPV    02/25/2021  Pathology Gynecology Specimen                    Patient Care Team:  Mary Ellen Yousif M.D. as PCP - General (Family Medicine)    Social History     Tobacco Use    Smoking status: Never    Smokeless tobacco: Never   Vaping Use    Vaping Use: Never used   Substance Use Topics    Alcohol use: Yes     Alcohol/week: 1.8 oz     Types: 3 Standard drinks or equivalent per week     Comment: 2 - 3 glasses 4 days a week    Drug use: Never     Family History   Problem Relation Age of Onset    Heart Disease Mother     Hypertension Mother     COPD Father     Other Father     Kidney Disease Father     Hyperlipidemia Father     Alcohol abuse Father      She  has a past medical history of Hypertension and Sleep apnea (01/01/2012).   Past Surgical History:   Procedure Laterality Date    EYE SURGERY  2016    eye lens implants    BUNIONECTOMY Right 2015    CHOLECYSTECTOMY  2013    LUMPECTOMY  2004,2014    breast implants and revision    MD BREAST AUGMENTATION WITH IMPLANT      PRIMARY C SECTION      1989       Exam:   /80 (BP Location: Left arm, Patient Position: Sitting, BP Cuff Size: Adult)   Pulse 66   Temp 36.4 °C (97.6 °F) (Temporal)   Resp 17   Ht 1.626 m (5' 4\")   Wt 52.3 kg (115 lb 3.2 oz)   SpO2 96%  Body mass index is 19.77 kg/m².    Hearing good.    Dentition good  Alert, oriented in no acute distress.  Eye contact is good, speech goal directed, affect calm  Constitutional: She appears well-developed and well-nourished. She appears not diaphoretic. No distress.   HENT: Right Ear: External ear normal. Left Ear: External ear normal. Tympanic membranes clear and intact.   Nose: Nose normal.   Mouth/Throat: Oropharynx is clear and moist. No oropharyngeal exudate.     Eyes: Conjunctivae and extraocular motions are normal. Pupils " are equal, round, and reactive to light. No scleral icterus.   Neck: Normal range of motion. Neck supple. No thyromegaly present.   Cardiovascular: Normal rate, regular rhythm, normal heart sounds and intact distal pulses.  Exam reveals no gallop and no friction rub.  No murmur heard. No carotid bruits.   Pulmonary/Chest: Effort normal and breath sounds normal. No respiratory distress. She has no wheezes. She has no rales.   Abdominal: Soft. Bowel sounds are normal. She exhibits no distension and no mass. No tenderness. She has no rebound and no guarding.   Lymphadenopathy:  She has no cervical adenopathy.   Neurological: She is alert. She has normal reflexes. No cranial nerve deficit. She exhibits normal muscle tone.   Skin: Skin is warm and dry. No rash noted. She is not diaphoretic. No erythema.   Psychiatric: She has a normal mood and affect. Her behavior is normal.   Musculoskeletal: She exhibits no edema. Full strength throughout. 2+ DTR throughout.       Assessment and Plan. The following treatment and monitoring plan is recommended:     66 yo female.     1. Medicare annual wellness visit, subsequent        2. Essential hypertension -stable, continue current medication, healthy diet and routine exercise. Monitor.  CBC WITH DIFFERENTIAL    Comp Metabolic Panel    TSH WITH REFLEX TO FT4    lisinopril (PRINIVIL) 10 MG Tab      3. Obstructive sleep apnea syndrome -stable, continue cpap. Monitor.        4. History of colon polyps -colonoscopy up to date.   Recommend routine GI follow up. Done 2021, recall 10 years.        5. External hemorrhoid   Recommend adequate fiber and fluids.   Recommend routine kegel/pelvic floor exercises. Follow up with GI routinely.        6. Osteopenia of multiple sites   Recommend routine weight bearing exercise, adequate calcium and vitamin D intake.  Recommend maintain adequate weight/BMI.  TSH WITH REFLEX TO FT4    DS-BONE DENSITY STUDY (DEXA)      7. Vitamin D insufficiency -  assess labs.  VITAMIN D,25 HYDROXY (DEFICIENCY)      8. SOB (shortness of breath) on exertion - notes at high elevations, 10,000 ft. Otherwise without issues during regular exercise. Stable.  Monitor. Follow up as needed in future.        9. Memory problem- mild.   Will continue to monitor at this time. Consider further imaging as needed. Follow up in 6 months.        10. Screening for diabetes mellitus  HEMOGLOBIN A1C      11. Screening, lipid  Lipid Profile      12. Need for pneumococcal vaccination  Pneumococcal Conjugate Vaccine 20-Valent (19 yrs+)      13. Dense breast tissue on mammogram  US-SCREENING WHOLE BREAST (3D SCREENING)      14. Encounter for screening for malignant neoplasm of breast, unspecified screening modality  MA-SCREENING MAMMO BILAT W/TOMOSYNTHESIS W/CAD    US-SCREENING WHOLE BREAST (3D SCREENING)      15. Decreased libido   Reviewed options. Patient given name for provider to consider for further evaluation and discussion.        Recommend routine dental visits.     Services suggested: No services needed at this time  Health Care Screening recommendations as per orders if indicated.  Referrals offered: PT/OT/Nutrition counseling/Behavioral Health/Smoking cessation as per orders if indicated.    Discussion today about general wellness and lifestyle habits:    Prevent falls and reduce trip hazards; Cautioned about securing or removing rugs.  Have a working fire alarm and carbon monoxide detector;   Engage in regular physical activity and social activities     Follow-up: Return in about 6 weeks (around 8/9/2023).    This medical record contains text that has been entered with the assistance of computer voice recognition and dictation software.  Therefore, it may contain unintended errors in text, spelling, punctuation, or grammar.

## 2023-06-28 NOTE — PATIENT INSTRUCTIONS
You must fast 8 to 10 hours.  Lab orders are in the system which you can complete at any [facility].  Please follow-up for a visit after your lab work is complete.      Please call [phone] to schedule your imaging.

## 2023-07-20 ENCOUNTER — HOSPITAL ENCOUNTER (OUTPATIENT)
Dept: LAB | Facility: MEDICAL CENTER | Age: 68
End: 2023-07-20
Attending: FAMILY MEDICINE
Payer: MEDICARE

## 2023-07-20 DIAGNOSIS — Z13.220 SCREENING, LIPID: ICD-10-CM

## 2023-07-20 DIAGNOSIS — M85.89 OSTEOPENIA OF MULTIPLE SITES: ICD-10-CM

## 2023-07-20 DIAGNOSIS — Z13.1 SCREENING FOR DIABETES MELLITUS: ICD-10-CM

## 2023-07-20 DIAGNOSIS — I10 ESSENTIAL HYPERTENSION: ICD-10-CM

## 2023-07-20 DIAGNOSIS — E55.9 VITAMIN D INSUFFICIENCY: ICD-10-CM

## 2023-07-20 LAB
25(OH)D3 SERPL-MCNC: 47 NG/ML (ref 30–100)
ALBUMIN SERPL BCP-MCNC: 4.6 G/DL (ref 3.2–4.9)
ALBUMIN/GLOB SERPL: 1.9 G/DL
ALP SERPL-CCNC: 91 U/L (ref 30–99)
ALT SERPL-CCNC: 15 U/L (ref 2–50)
ANION GAP SERPL CALC-SCNC: 10 MMOL/L (ref 7–16)
AST SERPL-CCNC: 24 U/L (ref 12–45)
BASOPHILS # BLD AUTO: 1.1 % (ref 0–1.8)
BASOPHILS # BLD: 0.03 K/UL (ref 0–0.12)
BILIRUB SERPL-MCNC: 0.5 MG/DL (ref 0.1–1.5)
BUN SERPL-MCNC: 19 MG/DL (ref 8–22)
CALCIUM ALBUM COR SERPL-MCNC: 8.8 MG/DL (ref 8.5–10.5)
CALCIUM SERPL-MCNC: 9.3 MG/DL (ref 8.5–10.5)
CHLORIDE SERPL-SCNC: 108 MMOL/L (ref 96–112)
CHOLEST SERPL-MCNC: 202 MG/DL (ref 100–199)
CO2 SERPL-SCNC: 25 MMOL/L (ref 20–33)
CREAT SERPL-MCNC: 0.85 MG/DL (ref 0.5–1.4)
EOSINOPHIL # BLD AUTO: 0.07 K/UL (ref 0–0.51)
EOSINOPHIL NFR BLD: 2.5 % (ref 0–6.9)
ERYTHROCYTE [DISTWIDTH] IN BLOOD BY AUTOMATED COUNT: 47 FL (ref 35.9–50)
EST. AVERAGE GLUCOSE BLD GHB EST-MCNC: 114 MG/DL
GFR SERPLBLD CREATININE-BSD FMLA CKD-EPI: 75 ML/MIN/1.73 M 2
GLOBULIN SER CALC-MCNC: 2.4 G/DL (ref 1.9–3.5)
GLUCOSE SERPL-MCNC: 93 MG/DL (ref 65–99)
HBA1C MFR BLD: 5.6 % (ref 4–5.6)
HCT VFR BLD AUTO: 41.4 % (ref 37–47)
HDLC SERPL-MCNC: 108 MG/DL
HGB BLD-MCNC: 13.4 G/DL (ref 12–16)
IMM GRANULOCYTES # BLD AUTO: 0 K/UL (ref 0–0.11)
IMM GRANULOCYTES NFR BLD AUTO: 0 % (ref 0–0.9)
LDLC SERPL CALC-MCNC: 88 MG/DL
LYMPHOCYTES # BLD AUTO: 1.28 K/UL (ref 1–4.8)
LYMPHOCYTES NFR BLD: 45.7 % (ref 22–41)
MCH RBC QN AUTO: 29.1 PG (ref 27–33)
MCHC RBC AUTO-ENTMCNC: 32.4 G/DL (ref 32.2–35.5)
MCV RBC AUTO: 90 FL (ref 81.4–97.8)
MONOCYTES # BLD AUTO: 0.26 K/UL (ref 0–0.85)
MONOCYTES NFR BLD AUTO: 9.3 % (ref 0–13.4)
NEUTROPHILS # BLD AUTO: 1.16 K/UL (ref 1.82–7.42)
NEUTROPHILS NFR BLD: 41.4 % (ref 44–72)
NRBC # BLD AUTO: 0 K/UL
NRBC BLD-RTO: 0 /100 WBC (ref 0–0.2)
PLATELET # BLD AUTO: 232 K/UL (ref 164–446)
PMV BLD AUTO: 10.7 FL (ref 9–12.9)
POTASSIUM SERPL-SCNC: 4.6 MMOL/L (ref 3.6–5.5)
PROT SERPL-MCNC: 7 G/DL (ref 6–8.2)
RBC # BLD AUTO: 4.6 M/UL (ref 4.2–5.4)
SODIUM SERPL-SCNC: 143 MMOL/L (ref 135–145)
TRIGL SERPL-MCNC: 28 MG/DL (ref 0–149)
TSH SERPL DL<=0.005 MIU/L-ACNC: 1.42 UIU/ML (ref 0.38–5.33)
WBC # BLD AUTO: 2.8 K/UL (ref 4.8–10.8)

## 2023-07-20 PROCEDURE — 80053 COMPREHEN METABOLIC PANEL: CPT

## 2023-07-20 PROCEDURE — 82306 VITAMIN D 25 HYDROXY: CPT

## 2023-07-20 PROCEDURE — 85025 COMPLETE CBC W/AUTO DIFF WBC: CPT

## 2023-07-20 PROCEDURE — 84443 ASSAY THYROID STIM HORMONE: CPT

## 2023-07-20 PROCEDURE — 80061 LIPID PANEL: CPT

## 2023-07-20 PROCEDURE — 36415 COLL VENOUS BLD VENIPUNCTURE: CPT

## 2023-07-20 PROCEDURE — 83036 HEMOGLOBIN GLYCOSYLATED A1C: CPT | Mod: GA

## 2023-07-26 DIAGNOSIS — D72.819 LEUKOPENIA, UNSPECIFIED TYPE: ICD-10-CM

## 2023-08-08 ENCOUNTER — APPOINTMENT (RX ONLY)
Dept: URBAN - METROPOLITAN AREA CLINIC 38 | Facility: CLINIC | Age: 68
Setting detail: DERMATOLOGY
End: 2023-08-08

## 2023-08-08 DIAGNOSIS — Z71.89 OTHER SPECIFIED COUNSELING: ICD-10-CM

## 2023-08-08 DIAGNOSIS — D22 MELANOCYTIC NEVI: ICD-10-CM

## 2023-08-08 DIAGNOSIS — L65.9 NONSCARRING HAIR LOSS, UNSPECIFIED: ICD-10-CM

## 2023-08-08 DIAGNOSIS — D18.0 HEMANGIOMA: ICD-10-CM

## 2023-08-08 DIAGNOSIS — L81.4 OTHER MELANIN HYPERPIGMENTATION: ICD-10-CM

## 2023-08-08 DIAGNOSIS — L82.1 OTHER SEBORRHEIC KERATOSIS: ICD-10-CM

## 2023-08-08 DIAGNOSIS — D72.819 LEUKOPENIA, UNSPECIFIED TYPE: ICD-10-CM

## 2023-08-08 PROBLEM — D18.01 HEMANGIOMA OF SKIN AND SUBCUTANEOUS TISSUE: Status: ACTIVE | Noted: 2023-08-08

## 2023-08-08 PROBLEM — D22.9 MELANOCYTIC NEVI, UNSPECIFIED: Status: ACTIVE | Noted: 2023-08-08

## 2023-08-08 PROCEDURE — 99213 OFFICE O/P EST LOW 20 MIN: CPT

## 2023-08-08 PROCEDURE — ? COUNSELING

## 2023-08-08 ASSESSMENT — LOCATION DETAILED DESCRIPTION DERM
LOCATION DETAILED: INFERIOR MID FOREHEAD
LOCATION DETAILED: RIGHT PROXIMAL DORSAL FOREARM
LOCATION DETAILED: PERIUMBILICAL SKIN
LOCATION DETAILED: RIGHT INFERIOR UPPER BACK
LOCATION DETAILED: LEFT CLAVICULAR NECK
LOCATION DETAILED: LEFT DISTAL DORSAL FOREARM
LOCATION DETAILED: RIGHT MEDIAL TRAPEZIAL NECK
LOCATION DETAILED: RIGHT SUPERIOR PARIETAL SCALP

## 2023-08-08 ASSESSMENT — LOCATION ZONE DERM
LOCATION ZONE: FACE
LOCATION ZONE: TRUNK
LOCATION ZONE: SCALP
LOCATION ZONE: NECK
LOCATION ZONE: ARM

## 2023-08-08 ASSESSMENT — LOCATION SIMPLE DESCRIPTION DERM
LOCATION SIMPLE: LEFT FOREARM
LOCATION SIMPLE: SCALP
LOCATION SIMPLE: ABDOMEN
LOCATION SIMPLE: LEFT ANTERIOR NECK
LOCATION SIMPLE: POSTERIOR NECK
LOCATION SIMPLE: RIGHT FOREARM
LOCATION SIMPLE: INFERIOR FOREHEAD
LOCATION SIMPLE: RIGHT UPPER BACK

## 2023-08-08 NOTE — PROGRESS NOTES
1. Caller Name: Telma Vergara                         Call Back Number: [phone] (home)        How would the patient prefer to be contacted with a response: Relypsahart message    2. SPECIFIC Action To Be Taken: Orders pending, please sign. Pt is requesting for the referral to be sent to [facility] as they may be able to see her sooner. Pt requesting labs, in clinic notes to be faxed to them at [phone]. Attn: New Patient Referral.     3. Diagnosis/Clinical Reason for Request: Leukopenia, unspecified type    4. Specialty & Provider Name/Lab/Imaging Location: [facility]     5. Is appointment scheduled for requested order/referral: no    Patient was informed they will receive a return phone call from the office ONLY if there are any questions before processing their request. Advised to call back if they haven't received a call from the referral department in 5 days.

## 2023-09-06 ENCOUNTER — HOSPITAL ENCOUNTER (OUTPATIENT)
Dept: RADIOLOGY | Facility: MEDICAL CENTER | Age: 68
End: 2023-09-06
Attending: FAMILY MEDICINE
Payer: MEDICARE

## 2023-09-06 DIAGNOSIS — M85.89 OSTEOPENIA OF MULTIPLE SITES: ICD-10-CM

## 2023-09-06 DIAGNOSIS — R92.30 DENSE BREAST TISSUE ON MAMMOGRAM: ICD-10-CM

## 2023-09-06 DIAGNOSIS — Z12.39 ENCOUNTER FOR SCREENING FOR MALIGNANT NEOPLASM OF BREAST, UNSPECIFIED SCREENING MODALITY: ICD-10-CM

## 2023-09-06 DIAGNOSIS — Z12.31 VISIT FOR SCREENING MAMMOGRAM: ICD-10-CM

## 2023-09-06 PROCEDURE — 76641 ULTRASOUND BREAST COMPLETE: CPT

## 2023-09-06 PROCEDURE — 77063 BREAST TOMOSYNTHESIS BI: CPT

## 2023-09-06 PROCEDURE — 77080 DXA BONE DENSITY AXIAL: CPT

## 2023-09-21 ENCOUNTER — OFFICE VISIT (OUTPATIENT)
Dept: MEDICAL GROUP | Facility: IMAGING CENTER | Age: 68
End: 2023-09-21
Payer: MEDICARE

## 2023-09-21 VITALS
OXYGEN SATURATION: 98 % | TEMPERATURE: 97.7 F | RESPIRATION RATE: 16 BRPM | SYSTOLIC BLOOD PRESSURE: 138 MMHG | DIASTOLIC BLOOD PRESSURE: 80 MMHG | BODY MASS INDEX: 19.84 KG/M2 | HEIGHT: 64 IN | HEART RATE: 77 BPM | WEIGHT: 116.2 LBS

## 2023-09-21 DIAGNOSIS — R92.30 DENSE BREAST TISSUE ON MAMMOGRAM: ICD-10-CM

## 2023-09-21 DIAGNOSIS — M85.89 OSTEOPENIA OF MULTIPLE SITES: ICD-10-CM

## 2023-09-21 DIAGNOSIS — Z23 NEED FOR VACCINATION: ICD-10-CM

## 2023-09-21 DIAGNOSIS — D72.819 LEUKOPENIA, UNSPECIFIED TYPE: ICD-10-CM

## 2023-09-21 DIAGNOSIS — Z71.85 IMMUNIZATION COUNSELING: ICD-10-CM

## 2023-09-21 PROCEDURE — 90662 IIV NO PRSV INCREASED AG IM: CPT | Performed by: FAMILY MEDICINE

## 2023-09-21 PROCEDURE — 99214 OFFICE O/P EST MOD 30 MIN: CPT | Mod: 25 | Performed by: FAMILY MEDICINE

## 2023-09-21 PROCEDURE — G0008 ADMIN INFLUENZA VIRUS VAC: HCPCS | Performed by: FAMILY MEDICINE

## 2023-09-21 PROCEDURE — 1126F AMNT PAIN NOTED NONE PRSNT: CPT | Performed by: FAMILY MEDICINE

## 2023-09-21 PROCEDURE — 3079F DIAST BP 80-89 MM HG: CPT | Performed by: FAMILY MEDICINE

## 2023-09-21 PROCEDURE — 3075F SYST BP GE 130 - 139MM HG: CPT | Performed by: FAMILY MEDICINE

## 2023-09-21 ASSESSMENT — PAIN SCALES - GENERAL: PAINLEVEL: NO PAIN

## 2023-09-21 ASSESSMENT — FIBROSIS 4 INDEX: FIB4 SCORE: 1.79

## 2023-09-21 NOTE — PROGRESS NOTES
SUBJECTIVE:    Chief Complaint   Patient presents with    Lab Results     Screening for diabetes mellitus       HPI:     Telma Vergara is a 67 y.o. female here for review imaging.     Dexa- osteopenia.   T score lumbar  -1.1 to now -1.9; left femur -2.3 to now -2.3  Regular exercise. Hiking, weighted vests, etc.   Lifts weight for shoulder, some for back.   Son is ATC, so will work with him on exercises for spine.   Does not desire to take medication.     WBC decreased on labs, saw hematology.   Repeat at hematology office 4.3 or so.   Had further lab evaluation completed without concerning findings.   [place] of [place].   Prior urine abnormal, repeat normal.     Health Maintenance Due   Topic Date Due    Zoster (Shingles) Vaccines (1 of 2) Never done    COVID-19 Vaccine (5 - Moderna series) 01/16/2023    Influenza Vaccine (1) 09/01/2023   RSV vaccine recommended    ROS:  No recent fevers or chills. No nausea or vomiting. No diarrhea. No chest pains or shortness of breath. No lower extremity edema.    Current Outpatient Medications on File Prior to Visit   Medication Sig Dispense Refill    Cranberry-Vitamin C-Probiotic (AZO CRANBERRY PO) Take  by mouth.      Non Formulary Request Athletic Greens      Non Formulary Request Roma Ly Jennyfer      Non Formulary Request Roma Aguayo      lisinopril (PRINIVIL) 10 MG Tab Take 1 Tablet by mouth every day. 90 Tablet 3    Omega-3 Fatty Acids (FISH OIL) 1000 MG Cap capsule Take 1,000 mg by mouth 2 Times a Day. After meals       No current facility-administered medications on file prior to visit.       No Known Allergies    Patient Active Problem List    Diagnosis Date Noted    Osteopenia of multiple sites 05/13/2022    Essential hypertension 01/29/2021    Obstructive sleep apnea syndrome 01/29/2021    History of colon polyps 01/29/2021    External hemorrhoid 01/29/2021    SOB (shortness of breath) on exertion 01/29/2021       Past  "Medical History:   Diagnosis Date    Hypertension     Sleep apnea 01/01/2012         OBJECTIVE:   /80 (BP Location: Left arm, Patient Position: Sitting, BP Cuff Size: Adult)   Pulse 77   Temp 36.5 °C (97.7 °F) (Temporal)   Resp 16   Ht 1.626 m (5' 4\")   Wt 52.7 kg (116 lb 3.2 oz)   SpO2 98%   BMI 19.95 kg/m²   General: Well-developed well-nourished female, no acute distress  Neck: supple, no lymphadenopathy- cervical or supraclavicular, no thyromegaly  Cardiovascular: regular rate and rhythm, no murmurs, gallops, rubs  Lungs: clear to auscultation bilaterally, no wheezes, crackles, or rhonchi  Extremities: no cyanosis, clubbing, edema  Skin: Warm and dry  Psych: appropriate mood and affect      Narrative & Impression     9/6/2023 2:48 PM     HISTORY/REASON FOR EXAM:  Automated whole breast screening ultrasound.     TECHNIQUE/EXAM DESCRIPTION AND NUMBER OF VIEWS:  Screening breast ultrasound of both breasts performed using TargetingMantraUS to include imaging of all four quadrants and the retroareolar regions bilaterally with comprehensive full-field 3D volumetric imaging and anatomical coronal view.     COMPARISON:   Mammogram same day     FINDINGS:  Right breast:  Parenchymal pattern:  The breast shows heterogeneous background echotexture, which may obscure small masses. There is no sonographically suspicious mass identified.  Implant identified.     Left breast:  Parenchymal pattern:  The breast shows heterogeneous background echotexture, which may obscure small masses. There is no sonographically suspicious mass identified.  Implant identified.     IMPRESSION:     1.  No sonographic evidence of malignancy.     2.  Monthly self examination and annual screening mammography is recommended.           BIRADS Category:  R1 - Category 1:  Negative              Exam Ended: 09/06/23  4:08 PM Last Resulted: 09/07/23 11:47 AM           Narrative & Impression     9/6/2023 2:43 PM     HISTORY/REASON FOR EXAM:  " Routine Mammographic Screening.        TECHNIQUE/EXAM DESCRIPTION AND NUMBER OF VIEWS:  Bilateral tomosynthesis screening mammography with implants was performed with standard mammographic images generated from the data set. Images were reviewed and interpreted with CAD.     COMPARISON:   5/13/2022, 4/2/2021     FINDINGS:  Bilateral CC, MLO and implant displaced views were obtained. Subpectoral implants decrease sensitivity of the study for detection of malignancy. The visible implant contours are intact.     The breast parenchyma is extremely dense which limits the sensitivity of mammography.     There is no dominant mass, suspicious calcification, or any secondary malignant sign.     Benign calcifications again noted.     IMPRESSION:        1.  Breasts are extremely dense, which lowers sensitivity of mammography. No gross evidence of malignancy.     2.  Screening mammogram in one year is recommended.     R2 - CATEGORY 2: BENIGN FINDING(S)     Ten to twenty percent of all cancers can be categorized as hereditary and the clinical and financial value of identifying patients and families at risk is well documented. If you have a personal or family history of breast, ovarian, fallopian tube,   peritoneal or other cancer, please consult your physician regarding genetic counseling and testing.              Exam Ended: 09/06/23  2:59 PM Last Resulted: 09/07/23  9:32 AM           Narrative & Impression     9/6/2023 1:01 PM     HISTORY/REASON FOR EXAM:  Osteopenic L1-L4 levels and proximal left femur, postmenopausal.     TECHNIQUE/EXAM DESCRIPTION AND NUMBER OF VIEWS:  Dual x-ray bone densitometry was performed from the L1 through L3 levels and from the proximal left femur utilizing the GE Prodigy unit.     COMPARISON: Bone densitometry scan 3/2/2021.     FINDINGS:  The lumbar spine has a mean bone mineral density of 0.948 g/cm2, with a T score of -1.9 and a Z score of 0.2.     The proximal left femur has a mean bone  mineral density of 0.716 g/cm2, with a T score of -2.3 and a Z score of -0.7.     When compared with the most recent study dated 3/2/2021, there has been a 4.1% decrease in the bone mineral density of the lumbar spine and a 1.1% decrease in the bone mineral density of the proximal left femur.     IMPRESSION:     According to the World Health Organization classification, bone mineral density of this patient is osteopenic in the lumbar spine and in the proximal left femur.     10-year Probability of Fracture:  Major Osteoporotic     12.2%  Hip     3.0%  Population      [place] ()     Based on left femur neck BMD     INTERPRETING LOCATION: [address] [place] [address], [place], [address]           Exam Ended: 09/06/23  1:10 PM Last Resulted: 09/06/23  1:51 PM            Yellow     CLARITY UA Clear Clear    SPECIFIC GRAVITY UA 1.003 - 1.035 1.010    PH UA 5.0 - 8.0 6.5    LEUKOCYTE ESTERASE UA Negative Negative    NITRITE UA Negative Negative    PROTEIN UA Negative-Trace Negative    GLUCOSE UA Negative Negative    KETONES UA Negative Negative    UROBILINOGEN UA Normal mg/dL Normal    BILIRUBIN UA Negative Negative    BLOOD UA Negative Negative        ASSESSMENT/PLAN:    67 y.o.female     1. Osteopenia of multiple sites-stable vitamin D levels.   Recommend routine weight bearing exercise, adequate calcium and vitamin D intake. Discussed adequate nutrition vs supplement intake. Repeat dexa in 2 years.   Consider working with PT in future as desired by patient.         2. Leukopenia, unspecified type - saw hematology for evaluation without concerning findings.   Monitor labs.  CBC WITH DIFFERENTIAL      3. Dense breast tissue on mammogram   Recommend routine annual screening mammogram and screening breast ultrasound.        4. Need for vaccination  Influenza Vaccine, High Dose (65+ Only)      5. Immunization counseling   Reviewed recommendations for vaccinations as above.            Return in about 3 months (around  12/21/2023).    This medical record contains text that has been entered with the assistance of computer voice recognition and dictation software.  Therefore, it may contain unintended errors in text, spelling, punctuation, or grammar.

## 2024-04-10 ENCOUNTER — HOSPITAL ENCOUNTER (OUTPATIENT)
Dept: LAB | Facility: MEDICAL CENTER | Age: 69
End: 2024-04-10
Attending: FAMILY MEDICINE
Payer: MEDICARE

## 2024-04-10 DIAGNOSIS — D72.819 LEUKOPENIA, UNSPECIFIED TYPE: ICD-10-CM

## 2024-04-10 LAB
BASOPHILS # BLD AUTO: 0.7 % (ref 0–1.8)
BASOPHILS # BLD: 0.03 K/UL (ref 0–0.12)
EOSINOPHIL # BLD AUTO: 0.11 K/UL (ref 0–0.51)
EOSINOPHIL NFR BLD: 2.6 % (ref 0–6.9)
ERYTHROCYTE [DISTWIDTH] IN BLOOD BY AUTOMATED COUNT: 45.2 FL (ref 35.9–50)
HCT VFR BLD AUTO: 40.6 % (ref 37–47)
HGB BLD-MCNC: 13.6 G/DL (ref 12–16)
IMM GRANULOCYTES # BLD AUTO: 0 K/UL (ref 0–0.11)
IMM GRANULOCYTES NFR BLD AUTO: 0 % (ref 0–0.9)
LYMPHOCYTES # BLD AUTO: 1.4 K/UL (ref 1–4.8)
LYMPHOCYTES NFR BLD: 32.5 % (ref 22–41)
MCH RBC QN AUTO: 29.4 PG (ref 27–33)
MCHC RBC AUTO-ENTMCNC: 33.5 G/DL (ref 32.2–35.5)
MCV RBC AUTO: 87.7 FL (ref 81.4–97.8)
MONOCYTES # BLD AUTO: 0.33 K/UL (ref 0–0.85)
MONOCYTES NFR BLD AUTO: 7.7 % (ref 0–13.4)
NEUTROPHILS # BLD AUTO: 2.44 K/UL (ref 1.82–7.42)
NEUTROPHILS NFR BLD: 56.5 % (ref 44–72)
NRBC # BLD AUTO: 0 K/UL
NRBC BLD-RTO: 0 /100 WBC (ref 0–0.2)
PLATELET # BLD AUTO: 230 K/UL (ref 164–446)
PMV BLD AUTO: 10.3 FL (ref 9–12.9)
RBC # BLD AUTO: 4.63 M/UL (ref 4.2–5.4)
WBC # BLD AUTO: 4.3 K/UL (ref 4.8–10.8)

## 2024-04-10 PROCEDURE — 85025 COMPLETE CBC W/AUTO DIFF WBC: CPT

## 2024-04-10 PROCEDURE — 36415 COLL VENOUS BLD VENIPUNCTURE: CPT

## 2024-07-08 SDOH — ECONOMIC STABILITY: FOOD INSECURITY: WITHIN THE PAST 12 MONTHS, THE FOOD YOU BOUGHT JUST DIDN'T LAST AND YOU DIDN'T HAVE MONEY TO GET MORE.: NEVER TRUE

## 2024-07-08 SDOH — HEALTH STABILITY: PHYSICAL HEALTH: ON AVERAGE, HOW MANY DAYS PER WEEK DO YOU ENGAGE IN MODERATE TO STRENUOUS EXERCISE (LIKE A BRISK WALK)?: 7 DAYS

## 2024-07-08 SDOH — HEALTH STABILITY: PHYSICAL HEALTH: ON AVERAGE, HOW MANY MINUTES DO YOU ENGAGE IN EXERCISE AT THIS LEVEL?: 90 MIN

## 2024-07-08 SDOH — ECONOMIC STABILITY: HOUSING INSECURITY: IN THE LAST 12 MONTHS, HOW MANY PLACES HAVE YOU LIVED?: 2

## 2024-07-08 SDOH — ECONOMIC STABILITY: INCOME INSECURITY: IN THE LAST 12 MONTHS, WAS THERE A TIME WHEN YOU WERE NOT ABLE TO PAY THE MORTGAGE OR RENT ON TIME?: NO

## 2024-07-08 SDOH — ECONOMIC STABILITY: FOOD INSECURITY: WITHIN THE PAST 12 MONTHS, YOU WORRIED THAT YOUR FOOD WOULD RUN OUT BEFORE YOU GOT MONEY TO BUY MORE.: NEVER TRUE

## 2024-07-08 SDOH — HEALTH STABILITY: MENTAL HEALTH
STRESS IS WHEN SOMEONE FEELS TENSE, NERVOUS, ANXIOUS, OR CAN'T SLEEP AT NIGHT BECAUSE THEIR MIND IS TROUBLED. HOW STRESSED ARE YOU?: NOT AT ALL

## 2024-07-08 SDOH — ECONOMIC STABILITY: INCOME INSECURITY: HOW HARD IS IT FOR YOU TO PAY FOR THE VERY BASICS LIKE FOOD, HOUSING, MEDICAL CARE, AND HEATING?: NOT HARD AT ALL

## 2024-07-08 ASSESSMENT — SOCIAL DETERMINANTS OF HEALTH (SDOH)
HOW OFTEN DO YOU GET TOGETHER WITH FRIENDS OR RELATIVES?: ONCE A WEEK
IN A TYPICAL WEEK, HOW MANY TIMES DO YOU TALK ON THE PHONE WITH FAMILY, FRIENDS, OR NEIGHBORS?: THREE TIMES A WEEK
HOW OFTEN DO YOU ATTENT MEETINGS OF THE CLUB OR ORGANIZATION YOU BELONG TO?: 1 TO 4 TIMES PER YEAR
HOW HARD IS IT FOR YOU TO PAY FOR THE VERY BASICS LIKE FOOD, HOUSING, MEDICAL CARE, AND HEATING?: NOT HARD AT ALL
WITHIN THE PAST 12 MONTHS, YOU WORRIED THAT YOUR FOOD WOULD RUN OUT BEFORE YOU GOT THE MONEY TO BUY MORE: NEVER TRUE
IN THE PAST 12 MONTHS, HAS THE ELECTRIC, GAS, OIL, OR WATER COMPANY THREATENED TO SHUT OFF SERVICE IN YOUR HOME?: NO
DO YOU BELONG TO ANY CLUBS OR ORGANIZATIONS SUCH AS CHURCH GROUPS UNIONS, FRATERNAL OR ATHLETIC GROUPS, OR SCHOOL GROUPS?: YES
DO YOU BELONG TO ANY CLUBS OR ORGANIZATIONS SUCH AS CHURCH GROUPS UNIONS, FRATERNAL OR ATHLETIC GROUPS, OR SCHOOL GROUPS?: YES
HOW OFTEN DO YOU HAVE A DRINK CONTAINING ALCOHOL: 4 OR MORE TIMES A WEEK
HOW OFTEN DO YOU ATTEND CHURCH OR RELIGIOUS SERVICES?: NEVER
HOW OFTEN DO YOU GET TOGETHER WITH FRIENDS OR RELATIVES?: ONCE A WEEK
HOW OFTEN DO YOU HAVE SIX OR MORE DRINKS ON ONE OCCASION: NEVER
HOW OFTEN DO YOU ATTEND CHURCH OR RELIGIOUS SERVICES?: NEVER
HOW OFTEN DO YOU ATTENT MEETINGS OF THE CLUB OR ORGANIZATION YOU BELONG TO?: 1 TO 4 TIMES PER YEAR
HOW MANY DRINKS CONTAINING ALCOHOL DO YOU HAVE ON A TYPICAL DAY WHEN YOU ARE DRINKING: 1 OR 2
IN A TYPICAL WEEK, HOW MANY TIMES DO YOU TALK ON THE PHONE WITH FAMILY, FRIENDS, OR NEIGHBORS?: THREE TIMES A WEEK

## 2024-07-08 ASSESSMENT — LIFESTYLE VARIABLES
HOW MANY STANDARD DRINKS CONTAINING ALCOHOL DO YOU HAVE ON A TYPICAL DAY: 1 OR 2
SKIP TO QUESTIONS 9-10: 1
HOW OFTEN DO YOU HAVE SIX OR MORE DRINKS ON ONE OCCASION: NEVER
HOW OFTEN DO YOU HAVE A DRINK CONTAINING ALCOHOL: 4 OR MORE TIMES A WEEK
AUDIT-C TOTAL SCORE: 4

## 2024-07-10 ENCOUNTER — APPOINTMENT (OUTPATIENT)
Dept: MEDICAL GROUP | Facility: IMAGING CENTER | Age: 69
End: 2024-07-10
Payer: MEDICARE

## 2024-07-10 VITALS
OXYGEN SATURATION: 94 % | TEMPERATURE: 98.5 F | WEIGHT: 116.4 LBS | BODY MASS INDEX: 19.87 KG/M2 | DIASTOLIC BLOOD PRESSURE: 80 MMHG | HEIGHT: 64 IN | SYSTOLIC BLOOD PRESSURE: 132 MMHG | HEART RATE: 78 BPM | RESPIRATION RATE: 15 BRPM

## 2024-07-10 DIAGNOSIS — Z87.440 HISTORY OF UTI: ICD-10-CM

## 2024-07-10 DIAGNOSIS — D72.819 LEUKOPENIA, UNSPECIFIED TYPE: ICD-10-CM

## 2024-07-10 DIAGNOSIS — R92.333 HETEROGENEOUSLY DENSE TISSUE OF BOTH BREASTS ON MAMMOGRAPHY: ICD-10-CM

## 2024-07-10 DIAGNOSIS — R14.0 BLOATING SYMPTOM: ICD-10-CM

## 2024-07-10 DIAGNOSIS — M85.89 OSTEOPENIA OF MULTIPLE SITES: ICD-10-CM

## 2024-07-10 DIAGNOSIS — R06.02 SOB (SHORTNESS OF BREATH) ON EXERTION: ICD-10-CM

## 2024-07-10 DIAGNOSIS — R22.0 FACIAL SWELLING: ICD-10-CM

## 2024-07-10 DIAGNOSIS — Z13.6 SCREENING FOR CARDIOVASCULAR CONDITION: ICD-10-CM

## 2024-07-10 DIAGNOSIS — Z12.31 ENCOUNTER FOR SCREENING MAMMOGRAM FOR MALIGNANT NEOPLASM OF BREAST: ICD-10-CM

## 2024-07-10 DIAGNOSIS — K64.4 EXTERNAL HEMORRHOID: ICD-10-CM

## 2024-07-10 DIAGNOSIS — G47.33 OBSTRUCTIVE SLEEP APNEA SYNDROME: ICD-10-CM

## 2024-07-10 DIAGNOSIS — E55.9 VITAMIN D INSUFFICIENCY: ICD-10-CM

## 2024-07-10 DIAGNOSIS — Z00.00 MEDICARE ANNUAL WELLNESS VISIT, SUBSEQUENT: ICD-10-CM

## 2024-07-10 DIAGNOSIS — Z13.1 SCREENING FOR DIABETES MELLITUS: ICD-10-CM

## 2024-07-10 DIAGNOSIS — Z86.010 HISTORY OF COLON POLYPS: ICD-10-CM

## 2024-07-10 DIAGNOSIS — I10 ESSENTIAL HYPERTENSION: ICD-10-CM

## 2024-07-10 PROCEDURE — 1126F AMNT PAIN NOTED NONE PRSNT: CPT | Performed by: FAMILY MEDICINE

## 2024-07-10 PROCEDURE — 3075F SYST BP GE 130 - 139MM HG: CPT | Performed by: FAMILY MEDICINE

## 2024-07-10 PROCEDURE — G0439 PPPS, SUBSEQ VISIT: HCPCS | Performed by: FAMILY MEDICINE

## 2024-07-10 PROCEDURE — 3079F DIAST BP 80-89 MM HG: CPT | Performed by: FAMILY MEDICINE

## 2024-07-10 RX ORDER — LISINOPRIL 10 MG/1
10 TABLET ORAL DAILY
Qty: 90 TABLET | Refills: 3 | Status: SHIPPED | OUTPATIENT
Start: 2024-07-10

## 2024-07-10 ASSESSMENT — PATIENT HEALTH QUESTIONNAIRE - PHQ9: CLINICAL INTERPRETATION OF PHQ2 SCORE: 0

## 2024-07-10 ASSESSMENT — PAIN SCALES - GENERAL: PAINLEVEL: NO PAIN

## 2024-07-10 ASSESSMENT — FIBROSIS 4 INDEX: FIB4 SCORE: 1.83

## 2024-07-10 ASSESSMENT — ACTIVITIES OF DAILY LIVING (ADL): BATHING_REQUIRES_ASSISTANCE: 0

## 2024-07-10 ASSESSMENT — ENCOUNTER SYMPTOMS: GENERAL WELL-BEING: EXCELLENT

## 2024-07-17 ENCOUNTER — APPOINTMENT (RX ONLY)
Dept: URBAN - METROPOLITAN AREA CLINIC 36 | Facility: CLINIC | Age: 69
Setting detail: DERMATOLOGY
End: 2024-07-17

## 2024-07-17 ENCOUNTER — NON-PROVIDER VISIT (OUTPATIENT)
Dept: SLEEP MEDICINE | Facility: MEDICAL CENTER | Age: 69
End: 2024-07-17
Attending: FAMILY MEDICINE
Payer: MEDICARE

## 2024-07-17 VITALS — BODY MASS INDEX: 19.46 KG/M2 | HEIGHT: 64 IN | WEIGHT: 114 LBS

## 2024-07-17 DIAGNOSIS — R06.02 SOB (SHORTNESS OF BREATH) ON EXERTION: ICD-10-CM

## 2024-07-17 PROCEDURE — 94729 DIFFUSING CAPACITY: CPT | Performed by: INTERNAL MEDICINE

## 2024-07-17 PROCEDURE — 94726 PLETHYSMOGRAPHY LUNG VOLUMES: CPT | Mod: 26 | Performed by: INTERNAL MEDICINE

## 2024-07-17 PROCEDURE — 94729 DIFFUSING CAPACITY: CPT | Mod: 26 | Performed by: INTERNAL MEDICINE

## 2024-07-17 PROCEDURE — 94060 EVALUATION OF WHEEZING: CPT | Mod: 26 | Performed by: INTERNAL MEDICINE

## 2024-07-17 PROCEDURE — 94060 EVALUATION OF WHEEZING: CPT | Performed by: INTERNAL MEDICINE

## 2024-07-17 PROCEDURE — 94726 PLETHYSMOGRAPHY LUNG VOLUMES: CPT | Performed by: INTERNAL MEDICINE

## 2024-07-17 ASSESSMENT — FIBROSIS 4 INDEX: FIB4 SCORE: 1.83

## 2024-07-17 NOTE — HPI: COSMETIC CONSULTATION
When Outside In The Sun, Do You...: rarely burns, mostly tans
Additional History: Sunscreen all the time. Never had laser before. Pt uses philosophy for skin care.

## 2024-07-19 ENCOUNTER — HOSPITAL ENCOUNTER (OUTPATIENT)
Dept: LAB | Facility: MEDICAL CENTER | Age: 69
End: 2024-07-19
Attending: FAMILY MEDICINE
Payer: MEDICARE

## 2024-07-19 DIAGNOSIS — I10 ESSENTIAL HYPERTENSION: ICD-10-CM

## 2024-07-19 DIAGNOSIS — Z13.1 SCREENING FOR DIABETES MELLITUS: ICD-10-CM

## 2024-07-19 DIAGNOSIS — M85.89 OSTEOPENIA OF MULTIPLE SITES: ICD-10-CM

## 2024-07-19 DIAGNOSIS — Z13.6 SCREENING FOR CARDIOVASCULAR CONDITION: ICD-10-CM

## 2024-07-19 DIAGNOSIS — E55.9 VITAMIN D INSUFFICIENCY: ICD-10-CM

## 2024-07-19 LAB
25(OH)D3 SERPL-MCNC: 58 NG/ML (ref 30–100)
ALBUMIN SERPL BCP-MCNC: 4.2 G/DL (ref 3.2–4.9)
ALBUMIN/GLOB SERPL: 1.8 G/DL
ALP SERPL-CCNC: 89 U/L (ref 30–99)
ALT SERPL-CCNC: 22 U/L (ref 2–50)
ANION GAP SERPL CALC-SCNC: 13 MMOL/L (ref 7–16)
AST SERPL-CCNC: 22 U/L (ref 12–45)
BASOPHILS # BLD AUTO: 1.5 % (ref 0–1.8)
BASOPHILS # BLD: 0.04 K/UL (ref 0–0.12)
BILIRUB SERPL-MCNC: 0.6 MG/DL (ref 0.1–1.5)
BUN SERPL-MCNC: 16 MG/DL (ref 8–22)
CALCIUM ALBUM COR SERPL-MCNC: 9.2 MG/DL (ref 8.5–10.5)
CALCIUM SERPL-MCNC: 9.4 MG/DL (ref 8.5–10.5)
CHLORIDE SERPL-SCNC: 105 MMOL/L (ref 96–112)
CHOLEST SERPL-MCNC: 200 MG/DL (ref 100–199)
CO2 SERPL-SCNC: 24 MMOL/L (ref 20–33)
CREAT SERPL-MCNC: 0.84 MG/DL (ref 0.5–1.4)
EOSINOPHIL # BLD AUTO: 0.09 K/UL (ref 0–0.51)
EOSINOPHIL NFR BLD: 3.3 % (ref 0–6.9)
ERYTHROCYTE [DISTWIDTH] IN BLOOD BY AUTOMATED COUNT: 48 FL (ref 35.9–50)
EST. AVERAGE GLUCOSE BLD GHB EST-MCNC: 103 MG/DL
FASTING STATUS PATIENT QL REPORTED: NORMAL
GFR SERPLBLD CREATININE-BSD FMLA CKD-EPI: 75 ML/MIN/1.73 M 2
GLOBULIN SER CALC-MCNC: 2.4 G/DL (ref 1.9–3.5)
GLUCOSE SERPL-MCNC: 91 MG/DL (ref 65–99)
HBA1C MFR BLD: 5.2 % (ref 4–5.6)
HCT VFR BLD AUTO: 38.7 % (ref 37–47)
HDLC SERPL-MCNC: 103 MG/DL
HGB BLD-MCNC: 13.2 G/DL (ref 12–16)
IMM GRANULOCYTES # BLD AUTO: 0 K/UL (ref 0–0.11)
IMM GRANULOCYTES NFR BLD AUTO: 0 % (ref 0–0.9)
LDLC SERPL CALC-MCNC: 89 MG/DL
LYMPHOCYTES # BLD AUTO: 1.32 K/UL (ref 1–4.8)
LYMPHOCYTES NFR BLD: 48.7 % (ref 22–41)
MCH RBC QN AUTO: 30.4 PG (ref 27–33)
MCHC RBC AUTO-ENTMCNC: 34.1 G/DL (ref 32.2–35.5)
MCV RBC AUTO: 89.2 FL (ref 81.4–97.8)
MONOCYTES # BLD AUTO: 0.26 K/UL (ref 0–0.85)
MONOCYTES NFR BLD AUTO: 9.6 % (ref 0–13.4)
NEUTROPHILS # BLD AUTO: 1 K/UL (ref 1.82–7.42)
NEUTROPHILS NFR BLD: 36.9 % (ref 44–72)
NRBC # BLD AUTO: 0 K/UL
NRBC BLD-RTO: 0 /100 WBC (ref 0–0.2)
PLATELET # BLD AUTO: 207 K/UL (ref 164–446)
PMV BLD AUTO: 10.7 FL (ref 9–12.9)
POTASSIUM SERPL-SCNC: 4.5 MMOL/L (ref 3.6–5.5)
PROT SERPL-MCNC: 6.6 G/DL (ref 6–8.2)
RBC # BLD AUTO: 4.34 M/UL (ref 4.2–5.4)
SODIUM SERPL-SCNC: 142 MMOL/L (ref 135–145)
TRIGL SERPL-MCNC: 41 MG/DL (ref 0–149)
TSH SERPL DL<=0.005 MIU/L-ACNC: 2.25 UIU/ML (ref 0.38–5.33)
WBC # BLD AUTO: 2.7 K/UL (ref 4.8–10.8)

## 2024-07-19 PROCEDURE — 80053 COMPREHEN METABOLIC PANEL: CPT

## 2024-07-19 PROCEDURE — 84443 ASSAY THYROID STIM HORMONE: CPT

## 2024-07-19 PROCEDURE — 85025 COMPLETE CBC W/AUTO DIFF WBC: CPT

## 2024-07-19 PROCEDURE — 82306 VITAMIN D 25 HYDROXY: CPT

## 2024-07-19 PROCEDURE — 83036 HEMOGLOBIN GLYCOSYLATED A1C: CPT | Mod: GA

## 2024-07-19 PROCEDURE — 80061 LIPID PANEL: CPT

## 2024-07-19 PROCEDURE — 36415 COLL VENOUS BLD VENIPUNCTURE: CPT

## 2024-08-02 ENCOUNTER — HOSPITAL ENCOUNTER (OUTPATIENT)
Dept: LAB | Facility: MEDICAL CENTER | Age: 69
End: 2024-08-02
Attending: FAMILY MEDICINE
Payer: MEDICARE

## 2024-08-02 DIAGNOSIS — D72.819 LEUKOPENIA, UNSPECIFIED TYPE: ICD-10-CM

## 2024-08-02 LAB
BASOPHILS # BLD AUTO: 0.8 % (ref 0–1.8)
BASOPHILS # BLD: 0.03 K/UL (ref 0–0.12)
EOSINOPHIL # BLD AUTO: 0.09 K/UL (ref 0–0.51)
EOSINOPHIL NFR BLD: 2.5 % (ref 0–6.9)
ERYTHROCYTE [DISTWIDTH] IN BLOOD BY AUTOMATED COUNT: 48.7 FL (ref 35.9–50)
HCT VFR BLD AUTO: 39.4 % (ref 37–47)
HGB BLD-MCNC: 13 G/DL (ref 12–16)
IMM GRANULOCYTES # BLD AUTO: 0 K/UL (ref 0–0.11)
IMM GRANULOCYTES NFR BLD AUTO: 0 % (ref 0–0.9)
LYMPHOCYTES # BLD AUTO: 1.29 K/UL (ref 1–4.8)
LYMPHOCYTES NFR BLD: 35.8 % (ref 22–41)
MCH RBC QN AUTO: 30 PG (ref 27–33)
MCHC RBC AUTO-ENTMCNC: 33 G/DL (ref 32.2–35.5)
MCV RBC AUTO: 91 FL (ref 81.4–97.8)
MONOCYTES # BLD AUTO: 0.32 K/UL (ref 0–0.85)
MONOCYTES NFR BLD AUTO: 8.9 % (ref 0–13.4)
NEUTROPHILS # BLD AUTO: 1.87 K/UL (ref 1.82–7.42)
NEUTROPHILS NFR BLD: 52 % (ref 44–72)
NRBC # BLD AUTO: 0 K/UL
NRBC BLD-RTO: 0 /100 WBC (ref 0–0.2)
PLATELET # BLD AUTO: 207 K/UL (ref 164–446)
PMV BLD AUTO: 11.2 FL (ref 9–12.9)
RBC # BLD AUTO: 4.33 M/UL (ref 4.2–5.4)
WBC # BLD AUTO: 3.6 K/UL (ref 4.8–10.8)

## 2024-08-02 PROCEDURE — 85025 COMPLETE CBC W/AUTO DIFF WBC: CPT

## 2024-08-02 PROCEDURE — 36415 COLL VENOUS BLD VENIPUNCTURE: CPT

## 2024-08-07 ENCOUNTER — APPOINTMENT (RX ONLY)
Dept: URBAN - METROPOLITAN AREA CLINIC 36 | Facility: CLINIC | Age: 69
Setting detail: DERMATOLOGY
End: 2024-08-07

## 2024-08-07 DIAGNOSIS — Z41.9 ENCOUNTER FOR PROCEDURE FOR PURPOSES OTHER THAN REMEDYING HEALTH STATE, UNSPECIFIED: ICD-10-CM

## 2024-08-07 PROCEDURE — ? FRAXEL

## 2024-08-07 ASSESSMENT — LOCATION ZONE DERM: LOCATION ZONE: FACE

## 2024-08-07 ASSESSMENT — LOCATION DETAILED DESCRIPTION DERM
LOCATION DETAILED: LEFT INFERIOR FOREHEAD
LOCATION DETAILED: RIGHT MEDIAL BUCCAL CHEEK
LOCATION DETAILED: RIGHT INFERIOR CENTRAL MALAR CHEEK
LOCATION DETAILED: LEFT INFERIOR MEDIAL MALAR CHEEK
LOCATION DETAILED: RIGHT CHIN
LOCATION DETAILED: RIGHT INFERIOR FOREHEAD
LOCATION DETAILED: RIGHT SUPERIOR LATERAL FOREHEAD

## 2024-08-07 ASSESSMENT — LOCATION SIMPLE DESCRIPTION DERM
LOCATION SIMPLE: RIGHT CHEEK
LOCATION SIMPLE: RIGHT FOREHEAD
LOCATION SIMPLE: CHIN
LOCATION SIMPLE: LEFT CHEEK
LOCATION SIMPLE: LEFT FOREHEAD

## 2024-08-07 NOTE — PROCEDURE: FRAXEL
Treatment Number: 1
Treatment Level: 4
Location: dorsal arms
Location: full face
Small Plastic Eye Shield Text: The ocular mucosa was anesthetized with tetracaine. Once adequate anesthesia was optained, small plastic eye shields were inserted and remained in place until the procedure was completed.
External Cooling: Shahrzad Cryo 5
Total Coverage: 11%
External Cooling Fan Speed: 6
Tip: 15mm
Total Coverage: 35%
Medium Plastic Eye Shield Text: The ocular mucosa was anesthetized with tetracaine. Once adequate anesthesia was optained, medium plastic eye shields were inserted and remained in place until the procedure was completed.
Energy(Mj/Cm2): 20
Was An Eye Shield Used?: Yes - Small (Metal)
Price (Use Numbers Only, No Special Characters Or $): 630.00
Location: neck
Detail Level: Simple
Large Plastic Eye Shield Text: The ocular mucosa was anesthetized with tetracaine. Once adequate anesthesia was optained, large plastic eye shields were inserted and remained in place until the procedure was completed.
Wavelength: 1927nm
Consent: Written consent obtained, risks reviewed including but not limited to pain and incomplete improvement.
Location: dorsal forearms
Small Metal Eye Shield Text: The ocular mucosa was anesthetized with tetracaine. Once adequate anesthesia was optained, small metal eye shields were inserted and remained in place until the procedure was completed.
Add Post-Care Below To The Note: No
Medium Metal Eye Shield Text: The ocular mucosa was anesthetized with tetracaine. Once adequate anesthesia was optained, medium metal eye shields were inserted and remained in place until the procedure was completed.
Total Coverage: 25%
Indication: photodamage
Energy(Mj/Cm2): 30
Post-Care Instructions: I reviewed with the patient in detail post-care instructions. Patient should avoid sun until area fully healed.
Large Metal Eye Shield Text: The ocular mucosa was anesthetized with tetracaine. Once adequate anesthesia was optained, large metal eye shields were inserted and remained in place until the procedure was completed.
Location: decolletage of the chest

## 2024-08-15 ENCOUNTER — APPOINTMENT (RX ONLY)
Dept: URBAN - METROPOLITAN AREA CLINIC 38 | Facility: CLINIC | Age: 69
Setting detail: DERMATOLOGY
End: 2024-08-15

## 2024-08-15 DIAGNOSIS — L82.1 OTHER SEBORRHEIC KERATOSIS: ICD-10-CM

## 2024-08-15 DIAGNOSIS — D22 MELANOCYTIC NEVI: ICD-10-CM

## 2024-08-15 DIAGNOSIS — D18.0 HEMANGIOMA: ICD-10-CM

## 2024-08-15 DIAGNOSIS — L81.4 OTHER MELANIN HYPERPIGMENTATION: ICD-10-CM

## 2024-08-15 DIAGNOSIS — Z71.89 OTHER SPECIFIED COUNSELING: ICD-10-CM

## 2024-08-15 DIAGNOSIS — L82.0 INFLAMED SEBORRHEIC KERATOSIS: ICD-10-CM

## 2024-08-15 PROBLEM — D22.5 MELANOCYTIC NEVI OF TRUNK: Status: ACTIVE | Noted: 2024-08-15

## 2024-08-15 PROBLEM — D18.01 HEMANGIOMA OF SKIN AND SUBCUTANEOUS TISSUE: Status: ACTIVE | Noted: 2024-08-15

## 2024-08-15 PROCEDURE — 99213 OFFICE O/P EST LOW 20 MIN: CPT | Mod: 25

## 2024-08-15 PROCEDURE — ? COUNSELING

## 2024-08-15 PROCEDURE — ? LIQUID NITROGEN

## 2024-08-15 PROCEDURE — ? SUNSCREEN TREATMENT REGIMEN

## 2024-08-15 PROCEDURE — 17110 DESTRUCTION B9 LES UP TO 14: CPT | Mod: 52

## 2024-08-15 ASSESSMENT — LOCATION DETAILED DESCRIPTION DERM
LOCATION DETAILED: EPIGASTRIC SKIN
LOCATION DETAILED: RIGHT INFERIOR ANTERIOR NECK
LOCATION DETAILED: RIGHT SUPERIOR UPPER BACK
LOCATION DETAILED: RIGHT FOREHEAD
LOCATION DETAILED: LEFT ULNAR DORSAL HAND
LOCATION DETAILED: RIGHT PROXIMAL DORSAL FOREARM
LOCATION DETAILED: LEFT RADIAL DORSAL HAND
LOCATION DETAILED: RIGHT RADIAL DORSAL HAND
LOCATION DETAILED: RIGHT ANTERIOR LATERAL PROXIMAL THIGH
LOCATION DETAILED: LEFT PROXIMAL DORSAL FOREARM
LOCATION DETAILED: LEFT SUPERIOR UPPER BACK

## 2024-08-15 ASSESSMENT — LOCATION ZONE DERM
LOCATION ZONE: LEG
LOCATION ZONE: FACE
LOCATION ZONE: ARM
LOCATION ZONE: TRUNK
LOCATION ZONE: NECK
LOCATION ZONE: HAND

## 2024-08-15 ASSESSMENT — LOCATION SIMPLE DESCRIPTION DERM
LOCATION SIMPLE: RIGHT HAND
LOCATION SIMPLE: RIGHT UPPER BACK
LOCATION SIMPLE: LEFT UPPER BACK
LOCATION SIMPLE: ABDOMEN
LOCATION SIMPLE: LEFT FOREARM
LOCATION SIMPLE: RIGHT FOREARM
LOCATION SIMPLE: RIGHT FOREHEAD
LOCATION SIMPLE: RIGHT ANTERIOR NECK
LOCATION SIMPLE: RIGHT THIGH
LOCATION SIMPLE: LEFT HAND

## 2024-08-15 NOTE — PROCEDURE: COUNSELING
Detail Level: Generalized
Detail Level: Zone
There are no Wet Read(s) to document.
Detail Level: Simple

## 2024-08-15 NOTE — PROCEDURE: LIQUID NITROGEN
Consent: The patient's verbal consent was obtained including but not limited to risks of crusting, scabbing, blistering, scarring, darker or lighter pigmentary change, recurrence, incomplete removal and infection.
Spray Paint Text: The liquid nitrogen was applied to the skin utilizing a spray paint frosting technique.
Add 52 Modifier (Optional): yes
Medical Necessity Information: It is in your best interest to select a reason for this procedure from the list below. All of these items fulfill various CMS LCD requirements except the new and changing color options.
Number Of Freeze-Thaw Cycles: 2 freeze-thaw cycles
Render Note In Bullet Format When Appropriate: No
Medical Necessity Clause: This procedure was medically necessary because the lesions that were treated were:
Detail Level: Detailed
Post-Care Instructions: I reviewed with the patient in detail post-care instructions. Patient is to wear sunprotection, and avoid picking at any of the treated lesions. Pt may apply Vaseline to crusted or scabbing areas.

## 2024-09-09 ENCOUNTER — HOSPITAL ENCOUNTER (OUTPATIENT)
Dept: RADIOLOGY | Facility: MEDICAL CENTER | Age: 69
End: 2024-09-09
Attending: FAMILY MEDICINE
Payer: MEDICARE

## 2024-09-09 DIAGNOSIS — Z12.31 ENCOUNTER FOR SCREENING MAMMOGRAM FOR MALIGNANT NEOPLASM OF BREAST: ICD-10-CM

## 2024-09-09 PROCEDURE — 77067 SCR MAMMO BI INCL CAD: CPT

## 2024-09-10 ENCOUNTER — HOSPITAL ENCOUNTER (OUTPATIENT)
Dept: RADIOLOGY | Facility: MEDICAL CENTER | Age: 69
End: 2024-09-10
Attending: FAMILY MEDICINE
Payer: MEDICARE

## 2024-09-10 DIAGNOSIS — R92.333 HETEROGENEOUSLY DENSE TISSUE OF BOTH BREASTS ON MAMMOGRAPHY: ICD-10-CM

## 2024-09-10 PROCEDURE — 76641 ULTRASOUND BREAST COMPLETE: CPT

## 2024-09-11 ENCOUNTER — APPOINTMENT (OUTPATIENT)
Dept: MEDICAL GROUP | Facility: IMAGING CENTER | Age: 69
End: 2024-09-11
Payer: MEDICARE

## 2024-09-11 VITALS
RESPIRATION RATE: 16 BRPM | DIASTOLIC BLOOD PRESSURE: 70 MMHG | HEIGHT: 64 IN | BODY MASS INDEX: 20.22 KG/M2 | WEIGHT: 118.4 LBS | HEART RATE: 78 BPM | OXYGEN SATURATION: 96 % | SYSTOLIC BLOOD PRESSURE: 114 MMHG | TEMPERATURE: 98 F

## 2024-09-11 DIAGNOSIS — R06.02 SOB (SHORTNESS OF BREATH) ON EXERTION: ICD-10-CM

## 2024-09-11 DIAGNOSIS — D72.819 LEUKOPENIA, UNSPECIFIED TYPE: ICD-10-CM

## 2024-09-11 DIAGNOSIS — Z79.890 HORMONE REPLACEMENT THERAPY (HRT): ICD-10-CM

## 2024-09-11 DIAGNOSIS — I10 ESSENTIAL HYPERTENSION: ICD-10-CM

## 2024-09-11 DIAGNOSIS — R94.2 ABNORMAL PFT: ICD-10-CM

## 2024-09-11 DIAGNOSIS — R92.8 ABNORMAL ULTRASOUND OF BREAST: ICD-10-CM

## 2024-09-11 PROCEDURE — 1126F AMNT PAIN NOTED NONE PRSNT: CPT | Performed by: FAMILY MEDICINE

## 2024-09-11 PROCEDURE — 99214 OFFICE O/P EST MOD 30 MIN: CPT | Performed by: FAMILY MEDICINE

## 2024-09-11 PROCEDURE — 3078F DIAST BP <80 MM HG: CPT | Performed by: FAMILY MEDICINE

## 2024-09-11 PROCEDURE — 3074F SYST BP LT 130 MM HG: CPT | Performed by: FAMILY MEDICINE

## 2024-09-11 ASSESSMENT — FIBROSIS 4 INDEX: FIB4 SCORE: 1.54

## 2024-09-11 ASSESSMENT — PAIN SCALES - GENERAL: PAINLEVEL: NO PAIN

## 2024-09-11 NOTE — PROGRESS NOTES
SUBJECTIVE:    Chief Complaint   Patient presents with    Follow-Up     On lab results from 08/02/24       HPI:     Telma Vergara is a 68 y.o. female here for review of labs.   Leaving for [place] September 21.   Wbc improving on labs.   Hematology consult 2023. No annual follow up. No new symptoms.     PFT done- high altitude and outdoors might have some breathing issues, otherwise no issues.   Father had copd-was a heavy smoker.   Has had rhinorrhea in morning.   Declined allergy testing at this time.   Has not tried the albuterol yet.     Has started on HRT- APRN in [facility]. Had blood work done.   Overall responding well. Started with cream.   Wondering about basal temp on HRT.     Hypertension- Lisinopril 10 mg, stable.   Breathing exercise.   Has a glucose monitor. Monitoring what she eats.     ROS:  No recent fevers or chills. No nausea or vomiting. No diarrhea. No chest pains or shortness of breath. No lower extremity edema.    Current Outpatient Medications on File Prior to Visit   Medication Sig Dispense Refill    albuterol 108 (90 Base) MCG/ACT Aero Soln inhalation aerosol Inhale 2 Puffs every four hours as needed for Shortness of Breath. 1 Each 3    lisinopril (PRINIVIL) 10 MG Tab Take 1 Tablet by mouth every day. 90 Tablet 3    Cranberry-Vitamin C-Probiotic (AZO CRANBERRY PO) Take  by mouth.      Non Formulary Request Athletic Greens      Non Formulary Request Roma Aguayo      Omega-3 Fatty Acids (FISH OIL) 1000 MG Cap capsule Take 1,000 mg by mouth 2 Times a Day. After meals      Non Formulary Request Roma Burger       No current facility-administered medications on file prior to visit.       No Known Allergies    Patient Active Problem List    Diagnosis Date Noted    Osteopenia of multiple sites 05/13/2022    Essential hypertension 01/29/2021    Obstructive sleep apnea syndrome 01/29/2021    History of colon polyps 01/29/2021     "External hemorrhoid 01/29/2021    SOB (shortness of breath) on exertion 01/29/2021       Past Medical History:   Diagnosis Date    Hypertension     Osteoporosis     Sleep apnea 01/01/2012         OBJECTIVE:   /70 (BP Location: Left arm, Patient Position: Sitting, BP Cuff Size: Adult)   Pulse 78   Temp 36.7 °C (98 °F) (Temporal)   Resp 16   Ht 1.626 m (5' 4\")   Wt 53.7 kg (118 lb 6.4 oz)   SpO2 96%   BMI 20.32 kg/m²   General: Well-developed well-nourished female, no acute distress  Neck: supple, no lymphadenopathy- cervical or supraclavicular, no thyromegaly  Cardiovascular: regular rate and rhythm, no murmurs, gallops, rubs  Lungs: clear to auscultation bilaterally, no wheezes, crackles, or rhonchi  Abdomen: +bowel sounds, soft, nontender, nondistended, no rebound, no guarding, no hepatosplenomegaly  Extremities: no cyanosis, clubbing, edema  Skin: Warm and dry  Psych: appropriate mood and affect     Latest Reference Range & Units 08/02/24 07:52   WBC 4.8 - 10.8 K/uL 3.6 (L)   RBC 4.20 - 5.40 M/uL 4.33   Hemoglobin 12.0 - 16.0 g/dL 13.0   Hematocrit 37.0 - 47.0 % 39.4   MCV 81.4 - 97.8 fL 91.0   MCH 27.0 - 33.0 pg 30.0   MCHC 32.2 - 35.5 g/dL 33.0   RDW 35.9 - 50.0 fL 48.7   Platelet Count 164 - 446 K/uL 207   MPV 9.0 - 12.9 fL 11.2   Neutrophils-Polys 44.00 - 72.00 % 52.00   Neutrophils (Absolute) 1.82 - 7.42 K/uL 1.87   Lymphocytes 22.00 - 41.00 % 35.80   Lymphs (Absolute) 1.00 - 4.80 K/uL 1.29   Monocytes 0.00 - 13.40 % 8.90   Monos (Absolute) 0.00 - 0.85 K/uL 0.32   Eosinophils 0.00 - 6.90 % 2.50   Eos (Absolute) 0.00 - 0.51 K/uL 0.09   Basophils 0.00 - 1.80 % 0.80   Baso (Absolute) 0.00 - 0.12 K/uL 0.03   Immature Granulocytes 0.00 - 0.90 % 0.00   Immature Granulocytes (abs) 0.00 - 0.11 K/uL 0.00   Nucleated RBC 0.00 - 0.20 /100 WBC 0.00   NRBC (Absolute) K/uL 0.00   (L): Data is abnormally low    ASSESSMENT/PLAN:    68 y.o.female       1. Leukopenia, unspecified type -stable, improving. "   Monitor. Repeat in 3 months.   Saw hematology in past, follow up with specialist as needed in future.  CBC WITH DIFFERENTIAL      2. Abnormal PFT - as below.        3. SOB (shortness of breath) on exertion - PFT shows likely reactive airway. Notes with altitude and outdoor activities.  Recommend use albuterol as needed. Will continue to monitor.        4. Hormone replacement therapy (HRT) -stable, follow up with other provider for HRT.        5. Essential hypertension -stable, continue lisinopril 10 mg daily. Monitor.            Return in about 3 months (around 12/11/2024).    This medical record contains text that has been entered with the assistance of computer voice recognition and dictation software.  Therefore, it may contain unintended errors in text, spelling, punctuation, or grammar.

## 2024-12-09 ENCOUNTER — APPOINTMENT (OUTPATIENT)
Dept: RADIOLOGY | Facility: MEDICAL CENTER | Age: 69
End: 2024-12-09
Attending: FAMILY MEDICINE
Payer: MEDICARE

## 2024-12-09 ENCOUNTER — HOSPITAL ENCOUNTER (OUTPATIENT)
Dept: LAB | Facility: MEDICAL CENTER | Age: 69
End: 2024-12-09
Attending: FAMILY MEDICINE
Payer: MEDICARE

## 2024-12-09 DIAGNOSIS — D72.819 LEUKOPENIA, UNSPECIFIED TYPE: ICD-10-CM

## 2024-12-09 DIAGNOSIS — R92.8 ABNORMAL MAMMOGRAM: ICD-10-CM

## 2024-12-09 LAB
BASOPHILS # BLD AUTO: 0.7 % (ref 0–1.8)
BASOPHILS # BLD: 0.03 K/UL (ref 0–0.12)
EOSINOPHIL # BLD AUTO: 0.13 K/UL (ref 0–0.51)
EOSINOPHIL NFR BLD: 3 % (ref 0–6.9)
ERYTHROCYTE [DISTWIDTH] IN BLOOD BY AUTOMATED COUNT: 47.3 FL (ref 35.9–50)
HCT VFR BLD AUTO: 37.4 % (ref 37–47)
HGB BLD-MCNC: 12.8 G/DL (ref 12–16)
IMM GRANULOCYTES # BLD AUTO: 0.01 K/UL (ref 0–0.11)
IMM GRANULOCYTES NFR BLD AUTO: 0.2 % (ref 0–0.9)
LYMPHOCYTES # BLD AUTO: 1.25 K/UL (ref 1–4.8)
LYMPHOCYTES NFR BLD: 28.9 % (ref 22–41)
MCH RBC QN AUTO: 30.3 PG (ref 27–33)
MCHC RBC AUTO-ENTMCNC: 34.2 G/DL (ref 32.2–35.5)
MCV RBC AUTO: 88.6 FL (ref 81.4–97.8)
MONOCYTES # BLD AUTO: 0.33 K/UL (ref 0–0.85)
MONOCYTES NFR BLD AUTO: 7.6 % (ref 0–13.4)
NEUTROPHILS # BLD AUTO: 2.58 K/UL (ref 1.82–7.42)
NEUTROPHILS NFR BLD: 59.6 % (ref 44–72)
NRBC # BLD AUTO: 0 K/UL
NRBC BLD-RTO: 0 /100 WBC (ref 0–0.2)
PLATELET # BLD AUTO: 214 K/UL (ref 164–446)
PMV BLD AUTO: 9.4 FL (ref 9–12.9)
RBC # BLD AUTO: 4.22 M/UL (ref 4.2–5.4)
WBC # BLD AUTO: 4.3 K/UL (ref 4.8–10.8)

## 2024-12-09 PROCEDURE — 36415 COLL VENOUS BLD VENIPUNCTURE: CPT

## 2024-12-09 PROCEDURE — 85025 COMPLETE CBC W/AUTO DIFF WBC: CPT

## 2024-12-09 PROCEDURE — 77049 MRI BREAST C-+ W/CAD BI: CPT

## 2024-12-09 PROCEDURE — A9579 GAD-BASE MR CONTRAST NOS,1ML: HCPCS | Mod: JZ | Performed by: FAMILY MEDICINE

## 2024-12-09 PROCEDURE — 700117 HCHG RX CONTRAST REV CODE 255: Mod: JZ | Performed by: FAMILY MEDICINE

## 2024-12-09 RX ADMIN — GADOTERIDOL 15 ML: 279.3 INJECTION, SOLUTION INTRAVENOUS at 11:24

## 2024-12-11 ENCOUNTER — APPOINTMENT (OUTPATIENT)
Dept: MEDICAL GROUP | Facility: IMAGING CENTER | Age: 69
End: 2024-12-11
Payer: MEDICARE

## 2024-12-11 VITALS
OXYGEN SATURATION: 95 % | DIASTOLIC BLOOD PRESSURE: 80 MMHG | RESPIRATION RATE: 18 BRPM | BODY MASS INDEX: 20.14 KG/M2 | HEIGHT: 64 IN | TEMPERATURE: 98.9 F | HEART RATE: 75 BPM | WEIGHT: 118 LBS | SYSTOLIC BLOOD PRESSURE: 118 MMHG

## 2024-12-11 DIAGNOSIS — M54.50 LUMBAR BACK PAIN: ICD-10-CM

## 2024-12-11 DIAGNOSIS — R82.90 ABNORMAL URINE ODOR: ICD-10-CM

## 2024-12-11 DIAGNOSIS — Z87.440 HISTORY OF UTI: ICD-10-CM

## 2024-12-11 DIAGNOSIS — D72.819 LEUKOPENIA, UNSPECIFIED TYPE: ICD-10-CM

## 2024-12-11 DIAGNOSIS — T85.43XA BREAST IMPLANT RUPTURE, INITIAL ENCOUNTER: ICD-10-CM

## 2024-12-11 LAB
APPEARANCE UR: NORMAL
BILIRUB UR STRIP-MCNC: NEGATIVE MG/DL
COLOR UR AUTO: NORMAL
GLUCOSE UR STRIP.AUTO-MCNC: NEGATIVE MG/DL
KETONES UR STRIP.AUTO-MCNC: NEGATIVE MG/DL
LEUKOCYTE ESTERASE UR QL STRIP.AUTO: NEGATIVE
NITRITE UR QL STRIP.AUTO: NEGATIVE
PH UR STRIP.AUTO: 6 [PH] (ref 5–8)
PROT UR QL STRIP: NEGATIVE MG/DL
RBC UR QL AUTO: NEGATIVE
SP GR UR STRIP.AUTO: 1.02
UROBILINOGEN UR STRIP-MCNC: 0.2 MG/DL

## 2024-12-11 PROCEDURE — 99214 OFFICE O/P EST MOD 30 MIN: CPT | Performed by: FAMILY MEDICINE

## 2024-12-11 PROCEDURE — 3074F SYST BP LT 130 MM HG: CPT | Performed by: FAMILY MEDICINE

## 2024-12-11 PROCEDURE — 81002 URINALYSIS NONAUTO W/O SCOPE: CPT | Performed by: FAMILY MEDICINE

## 2024-12-11 PROCEDURE — 3079F DIAST BP 80-89 MM HG: CPT | Performed by: FAMILY MEDICINE

## 2024-12-11 RX ORDER — PROGESTERONE 100 MG/1
200 CAPSULE ORAL DAILY
COMMUNITY

## 2024-12-11 RX ORDER — TESTOSTERONE GEL, 1% 10 MG/G
50 GEL TRANSDERMAL DAILY
COMMUNITY

## 2024-12-11 ASSESSMENT — FIBROSIS 4 INDEX: FIB4 SCORE: 1.49

## 2024-12-11 NOTE — PROGRESS NOTES
SUBJECTIVE:    Chief Complaint   Patient presents with    Follow-Up     Lab results CBC for leukopenia 12/9  Imaging for MRI 12/9    Other     Pt report did a test for UA for urgency and back pain  [place] - result was negative   Pt report today still having odor  to the urine        HPI:     Telma Vergara is a 68 y.o. female here for follow-up of labs and imaging.  Mildly decreased white blood cell count-stable. Previously had covid infection.     Patient previously had UTI symptoms.  Still having some odor in her urine. Not sure if hormone therapy contributing.   No dysuria/hematuria.    Had LBP- no n/t/w, incontinence   Mid November.     Has scheduled with plastic surgeon for breast implant rupture, silicone. [facility], Dr. Abreu.     ROS:  No recent fevers or chills. No nausea or vomiting. No diarrhea. No chest pains or shortness of breath. No lower extremity edema.    Current Outpatient Medications on File Prior to Visit   Medication Sig Dispense Refill    progesterone (PROMETRIUM) 100 MG Cap Take 200 mg by mouth every day.      testosterone (TESTIM/ANDROGEL) 50 MG/5GM (1%) Gel gel Place 50 mg on the skin every day. Injection 5 mg taking x 2 week      estrogens, conjugated (PREMARIN) 0.3 MG Tab Take 0.3 mg by mouth every day. Injection 5 mg x 2  week      albuterol 108 (90 Base) MCG/ACT Aero Soln inhalation aerosol Inhale 2 Puffs every four hours as needed for Shortness of Breath. 1 Each 3    lisinopril (PRINIVIL) 10 MG Tab Take 1 Tablet by mouth every day. 90 Tablet 3    Non Formulary Request Athletic Greens      Non Formulary Request Roma Aguayo      Omega-3 Fatty Acids (FISH OIL) 1000 MG Cap capsule Take 1,000 mg by mouth 2 Times a Day. After meals      Cranberry-Vitamin C-Probiotic (AZO CRANBERRY PO) Take  by mouth.      Non Formulary Request Roma Burger       No current facility-administered medications on file prior to visit.       No Known  "Allergies    Patient Active Problem List    Diagnosis Date Noted    Osteopenia of multiple sites 05/13/2022    Essential hypertension 01/29/2021    Obstructive sleep apnea syndrome 01/29/2021    History of colon polyps 01/29/2021    External hemorrhoid 01/29/2021    SOB (shortness of breath) on exertion 01/29/2021       Past Medical History:   Diagnosis Date    Hypertension     Osteoporosis     Sleep apnea 01/01/2012         OBJECTIVE:   /80 (BP Location: Left arm, Patient Position: Sitting, BP Cuff Size: Adult)   Pulse 75   Temp 37.2 °C (98.9 °F) (Temporal)   Resp 18   Ht 1.626 m (5' 4\")   Wt 53.5 kg (118 lb)   SpO2 95%   BMI 20.25 kg/m²   General: Well-developed well-nourished female, no acute distress  Neck: supple, no lymphadenopathy- cervical or supraclavicular, no thyromegaly  Cardiovascular: regular rate and rhythm, no murmurs, gallops, rubs  Lungs: clear to auscultation bilaterally, no wheezes, crackles, or rhonchi  Abdomen: +bowel sounds, soft, nontender, nondistended, no rebound, no guarding, no hepatosplenomegaly, no CVA tenderness  Back: no spinal TTP, without significant paraspinal tenderness, adequate ROM  Extremities: no cyanosis, clubbing, edema.  BLE normal strength and 2+DTR bilateral.   Skin: Warm and dry  Psych: appropriate mood and affect      POC urine: Negative   Latest Reference Range & Units 12/09/24 12:00   WBC 4.8 - 10.8 K/uL 4.3 (L)   RBC 4.20 - 5.40 M/uL 4.22   Hemoglobin 12.0 - 16.0 g/dL 12.8   Hematocrit 37.0 - 47.0 % 37.4   MCV 81.4 - 97.8 fL 88.6   MCH 27.0 - 33.0 pg 30.3   MCHC 32.2 - 35.5 g/dL 34.2   RDW 35.9 - 50.0 fL 47.3   Platelet Count 164 - 446 K/uL 214   MPV 9.0 - 12.9 fL 9.4   Neutrophils-Polys 44.00 - 72.00 % 59.60   Neutrophils (Absolute) 1.82 - 7.42 K/uL 2.58   Lymphocytes 22.00 - 41.00 % 28.90   Lymphs (Absolute) 1.00 - 4.80 K/uL 1.25   Monocytes 0.00 - 13.40 % 7.60   Monos (Absolute) 0.00 - 0.85 K/uL 0.33   Eosinophils 0.00 - 6.90 % 3.00   Eos (Absolute) " 0.00 - 0.51 K/uL 0.13   Basophils 0.00 - 1.80 % 0.70   Baso (Absolute) 0.00 - 0.12 K/uL 0.03   Immature Granulocytes 0.00 - 0.90 % 0.20   Immature Granulocytes (abs) 0.00 - 0.11 K/uL 0.01   Nucleated RBC 0.00 - 0.20 /100 WBC 0.00   NRBC (Absolute) K/uL 0.00   (L): Data is abnormally low    Narrative & Impression     12/9/2024 10:36 AM     HISTORY/REASON FOR EXAM:  Heterogeneous appearance of breast implants on the whole breast screening ultrasound. Evaluation for implant rupture.        TECHNIQUE/EXAM DESCRIPTION:  MRI of the breast, bilateral without and with dynamic IV gadolinium enhancement.     MR imaging of the bilateral breasts was performed on a Yanna 3.0 Cindy scanner using a dedicated breast coil with the patient in the prone position, with axial T1, axial fat-suppressed T2, and bolus dynamic intravenous gadolinium enhanced   fat-suppressed 3D GRE sequences at precontrast, 30 second, 2 minute, 3 minute and 5 minute delays. Post processing subtraction images were obtained. Delayed postcontrast sagittal images also obtained.     15 mL ProHance contrast was administered intravenously.     COMPARISON:  9/10/2024, 9/9/2024.     FINDINGS:     Heterogeneous breast parenchyma bilaterally.  Background parenchymal enhancement is minimal and symmetric.     RIGHT BREAST: Intracapsular rupture of the right silicone breast implant. No evidence of extracapsular rupture. No enhancing masses. No nonmass enhancement. No lymphadenopathy.     LEFT BREAST: Intracapsular rupture of the left silicone breast implant. No evidence of extracapsular rupture. No enhancing masses. No nonmass enhancement. No lymphadenopathy.     OTHER FINDINGS: None.     IMPRESSION:     1.  Intracapsular rupture of both breast implants. No evidence of extracapsular rupture.  2.  No MRI evidence of malignancy in either breast.        R2 - CATEGORY 2: BENIGN FINDING(S)           Exam Ended: 12/09/24 11:23 AM Last Resulted: 12/10/24  1:08 PM          ASSESSMENT/PLAN:    68 y.o.female     1. Leukopenia, unspecified type -improve on last labs.    Will continue to monitor in future.       2. History of UTI  POCT Urinalysis      3. Abnormal urine odor -POCT urine negative.  Consider due to oral intake.  Otherwise asymptomatic.  Consider diet modifications.  Follow-up if any worsening or new symptoms.   POCT Urinalysis      4. Lumbar back pain - bilateral paraspinal region of level L4-5.   Recommend modify activities, routine conditioning exercises/stretching.  Reviewed consideration of imaging study if no improvements in the next month.       5. Breast implant rupture, initial encounter   Patient is scheduled with plastic surgeon.           Return if symptoms worsen or fail to improve.  Otherwise patient will plan to follow-up for annual wellness visit when due in future.    This medical record contains text that has been entered with the assistance of computer voice recognition and dictation software.  Therefore, it may contain unintended errors in text, spelling, punctuation, or grammar.

## 2025-01-10 ENCOUNTER — OFFICE VISIT (OUTPATIENT)
Dept: MEDICAL GROUP | Facility: IMAGING CENTER | Age: 70
End: 2025-01-10
Payer: MEDICARE

## 2025-01-10 VITALS
TEMPERATURE: 98.1 F | HEIGHT: 64 IN | BODY MASS INDEX: 19.81 KG/M2 | DIASTOLIC BLOOD PRESSURE: 74 MMHG | WEIGHT: 116 LBS | SYSTOLIC BLOOD PRESSURE: 110 MMHG | HEART RATE: 78 BPM | RESPIRATION RATE: 16 BRPM | OXYGEN SATURATION: 94 %

## 2025-01-10 DIAGNOSIS — T14.8XXA BRUISING: ICD-10-CM

## 2025-01-10 DIAGNOSIS — Z01.818 PREOP EXAMINATION: ICD-10-CM

## 2025-01-10 DIAGNOSIS — G47.33 OBSTRUCTIVE SLEEP APNEA SYNDROME: ICD-10-CM

## 2025-01-10 DIAGNOSIS — I10 ESSENTIAL HYPERTENSION: ICD-10-CM

## 2025-01-10 DIAGNOSIS — R94.31 ABNORMAL EKG: ICD-10-CM

## 2025-01-10 PROBLEM — R06.02 SOB (SHORTNESS OF BREATH) ON EXERTION: Status: RESOLVED | Noted: 2021-01-29 | Resolved: 2025-01-10

## 2025-01-10 PROCEDURE — 3074F SYST BP LT 130 MM HG: CPT | Performed by: FAMILY MEDICINE

## 2025-01-10 PROCEDURE — 3078F DIAST BP <80 MM HG: CPT | Performed by: FAMILY MEDICINE

## 2025-01-10 PROCEDURE — 99214 OFFICE O/P EST MOD 30 MIN: CPT | Mod: 25 | Performed by: FAMILY MEDICINE

## 2025-01-10 ASSESSMENT — FIBROSIS 4 INDEX: FIB4 SCORE: 1.51

## 2025-01-10 ASSESSMENT — PATIENT HEALTH QUESTIONNAIRE - PHQ9: CLINICAL INTERPRETATION OF PHQ2 SCORE: 0

## 2025-01-10 NOTE — PROGRESS NOTES
Chief Complaint   Patient presents with    Medical Clearance     Breast implants replace  2/24/2025 at [facility]       HPI:  69 y.o. female here for surgical clearance.  She is having her breast implants replaced.  Advanced plastic surgery.   Generally does not have any issues with breathing but may feel a bit short of breath with hiking at higher altitudes.  She has had prior evaluation.  July labs.  Prior labs done in December.  Sometimes might notice slight bruising easily.  No prior issues with anesthesia or surgical recovery.  She is currently on testosterone, progesterone and estrogen therapy.  Blood pressure is stable on lisinopril 10 mg daily.  Sleep apnea-on CPAP machine  She is on omega-3 supplements.  She is otherwise generally active.    Health Maintenance Due   Topic Date Due    COVID-19 Vaccine (6 - 2024-25 season) 09/01/2024       Immunization History   Administered Date(s) Administered    COVID-19, mRNA, LNP-S, PF, katie-sucrose, 30 mcg/0.3 mL 10/11/2023    Influenza Vac Subunit Quad Inj (Pf) 10/28/2018, 10/09/2020    Influenza Vaccine Adult HD 10/01/2021, 09/16/2022, 09/21/2023    Influenza Vaccine Quad Inj (Pf) 09/27/2016, 09/18/2017, 10/04/2019    Influenza high-dose trivalent (PF) 09/05/2024    Influenza, unspecified formulation 10/11/2014, 10/01/2015    MODERNA SARS-COV-2 VACCINE (12+) 02/10/2021, 03/10/2021, 11/17/2021    PFIZER BIVALENT SARS-COV-2 VACCINE (12+) 09/16/2022    Pneumococcal Conjugate Vaccine (PCV20) 06/28/2023    Pneumococcal polysaccharide vaccine (PPSV-23) 10/01/2021    RSV AREXVY VACCINE 09/05/2024    Tdap Vaccine 07/01/2010, 09/01/2016, 02/22/2020    Zoster Vaccine Recombinant (RZV) (SHINGRIX) 10/05/2023, 09/05/2024       Review of Systems:  Constitutional: Negative for fever, chills and malaise/fatigue.   HENT: Negative for congestion, sore throat, or swallowing issues.   Eyes: Negative for pain or vision changes.   Respiratory: Negative for cough and  shortness of breath.    Cardiovascular: Negative for leg swelling. No chest pain.   Gastrointestinal: Negative for nausea, vomiting, abdominal pain and diarrhea.   Genitourinary: Negative for dysuria and hematuria.   Skin: Negative for rash.   Neurological: Negative for dizziness, focal weakness and headaches.   Endo/Heme/Allergies: Does not bruise/bleed easily.   Psychiatric/Behavioral: Negative for depression.  The patient is not nervous/anxious.          Current Outpatient Medications:     progesterone (PROMETRIUM) 100 MG Cap, Take 200 mg by mouth every day., Disp: , Rfl:     testosterone (TESTIM/ANDROGEL) 50 MG/5GM (1%) Gel gel, Place 50 mg on the skin every day. Injection 5 mg taking x 2 week, Disp: , Rfl:     lisinopril (PRINIVIL) 10 MG Tab, Take 1 Tablet by mouth every day., Disp: 90 Tablet, Rfl: 3    Non Formulary Request, Athletic Greens, Disp: , Rfl:     Non Formulary Request, Roma Aguayo, Disp: , Rfl:     Omega-3 Fatty Acids (FISH OIL) 1000 MG Cap capsule, Take 1,000 mg by mouth 2 Times a Day. After meals, Disp: , Rfl:     estrogens, conjugated (PREMARIN) 0.3 MG Tab, Take 0.3 mg by mouth every day. Injection 5 mg x 2  week, Disp: , Rfl:     albuterol 108 (90 Base) MCG/ACT Aero Soln inhalation aerosol, Inhale 2 Puffs every four hours as needed for Shortness of Breath., Disp: 1 Each, Rfl: 3    Cranberry-Vitamin C-Probiotic (AZO CRANBERRY PO), Take  by mouth., Disp: , Rfl:     Non Formulary Request, Roma Burger, Disp: , Rfl:     No Known Allergies    Patient Active Problem List   Diagnosis    Essential hypertension    Obstructive sleep apnea syndrome    History of colon polyps    External hemorrhoid    SOB (shortness of breath) on exertion    Osteopenia of multiple sites       Past Medical History:   Diagnosis Date    Hypertension     Osteoporosis     Sleep apnea 01/01/2012       Past Surgical History:   Procedure Laterality Date    EYE SURGERY  2016    eye lens implants    BUNIONECTOMY  "Right 2015    CHOLECYSTECTOMY  2013    LUMPECTOMY  2004,2014    breast implants and revision    MA BREAST AUGMENTATION WITH IMPLANT      PRIMARY C SECTION      1989       Family History   Problem Relation Age of Onset    Heart Disease Mother     Hypertension Mother     COPD Father     Other Father     Kidney Disease Father     Hyperlipidemia Father     Alcohol abuse Father        Social History     Tobacco Use    Smoking status: Never    Smokeless tobacco: Never   Vaping Use    Vaping status: Never Used   Substance Use Topics    Alcohol use: Yes     Alcohol/week: 4.8 oz     Types: 5 Glasses of wine, 3 Standard drinks or equivalent per week     Comment: 5 glasses of wine a week    Drug use: Never         PHYSICAL EXAM:  /74 (BP Location: Left arm, Patient Position: Sitting, BP Cuff Size: Adult)   Pulse 78   Temp 36.7 °C (98.1 °F) (Temporal)   Resp 16   Ht 1.626 m (5' 4\")   Wt 52.6 kg (116 lb)   SpO2 94%   BMI 19.91 kg/m²   Constitutional: She appears well-developed and well-nourished. She appears not diaphoretic. No distress.   HENT: Right Ear: External ear normal. Left Ear: External ear normal. Tympanic membranes clear and intact.   Nose: Nose normal.   Mouth/Throat: Oropharynx is clear and moist. No oropharyngeal exudate.     Eyes: Conjunctivae and extraocular motions are normal. Pupils are equal, round, and reactive to light. No scleral icterus.   Neck: Normal range of motion. Neck supple. No thyromegaly present.   Cardiovascular: Normal rate, regular rhythm, normal heart sounds and intact distal pulses.  Exam reveals no gallop and no friction rub.  No murmur heard. No carotid bruits.   Pulmonary/Chest: Effort normal and breath sounds normal. No respiratory distress. She has no wheezes. She has no rales.   Abdominal: Soft. Bowel sounds are normal. She exhibits no distension and no mass. No tenderness. She has no rebound and no guarding.   Lymphadenopathy:  She has no cervical adenopathy. "   Neurological: She is alert. She has normal reflexes. No cranial nerve deficit. She exhibits normal muscle tone.   Skin: Skin is warm and dry. No rash noted. She is not diaphoretic. No erythema.   Psychiatric: She has a normal mood and affect. Her behavior is normal.   Musculoskeletal: She exhibits no edema. Full strength throughout. 2+ DTR throughout.       Narrative & Impression     3/2/2021 10:41 AM     HISTORY/REASON FOR EXAM:  Shortness of Breath.        TECHNIQUE/EXAM DESCRIPTION AND NUMBER OF VIEWS:  Two views of the chest.     COMPARISON:  None.     FINDINGS:  The cardiac silhouette is borderline enlarged.  No pulmonary infiltrates or consolidations are noted.  No pleural effusions are appreciated.  No pneumothorax.     IMPRESSION:     Borderline cardiomegaly without evidence of acute disease.        Exam Ended: 03/02/21 10:58 AM Last Resulted: 03/02/21 11:09 AM     Echocardiography Laboratory     CONCLUSIONS  Normal left ventricular systolic function.  Left ventricular ejection fraction is visually estimated to be 60-65%.  Aortic sclerosis without stenosis.  Normal right ventricular size and systolic function.  No prior study is available for comparison.      SLAVA LOMELI  Exam Date:         05/04/2021       EKG: sinus, HR 68, proabable left atrial enlargement, RSR' in v1 or  v2, likely normal variant      ASSESSMENT/PLAN:    This is a 69 y.o. female    1. Preop examination   Reviewed prior labs.  Patient will need to hold supplements prior to surgery.  Plan for clearance after echocardiogram and labs are complete. EKG      2. Essential hypertension-stable.  Continue lisinopril 10 mg daily.       3. Obstructive sleep apnea syndrome-stable.  Continue CPAP use.       4. Bruising-platelet stable on prior labs.  Assess lab work. APTT    Prothrombin Time      5. Abnormal EKG   Will assess echocardiogram. EC-ECHOCARDIOGRAM COMPLETE W/O CONT        Follow-up as needed after imaging and labs  complete.  Otherwise recommend routine follow-up in future.        This medical record contains text that has been entered with the assistance of computer voice recognition and dictation software.  Therefore, it may contain unintended errors in text, spelling, punctuation, or grammar.

## 2025-01-16 ENCOUNTER — HOSPITAL ENCOUNTER (OUTPATIENT)
Dept: CARDIOLOGY | Facility: MEDICAL CENTER | Age: 70
End: 2025-01-16
Attending: FAMILY MEDICINE
Payer: MEDICARE

## 2025-01-16 ENCOUNTER — HOSPITAL ENCOUNTER (OUTPATIENT)
Dept: LAB | Facility: MEDICAL CENTER | Age: 70
End: 2025-01-16
Attending: FAMILY MEDICINE
Payer: MEDICARE

## 2025-01-16 DIAGNOSIS — R94.31 ABNORMAL EKG: ICD-10-CM

## 2025-01-16 DIAGNOSIS — T14.8XXA BRUISING: ICD-10-CM

## 2025-01-16 LAB
APTT PPP: 23.6 SEC (ref 24.7–36)
INR PPP: 0.91 (ref 0.87–1.13)
LV EJECT FRACT  99904: 65
LV EJECT FRACT MOD 2C 99903: 68.93
LV EJECT FRACT MOD 4C 99902: 68.3
LV EJECT FRACT MOD BP 99901: 66.7
PROTHROMBIN TIME: 12.6 SEC (ref 12–14.6)

## 2025-01-16 PROCEDURE — 93306 TTE W/DOPPLER COMPLETE: CPT | Mod: 26 | Performed by: INTERNAL MEDICINE

## 2025-01-16 PROCEDURE — 85610 PROTHROMBIN TIME: CPT

## 2025-01-16 PROCEDURE — 85730 THROMBOPLASTIN TIME PARTIAL: CPT

## 2025-01-16 PROCEDURE — 93306 TTE W/DOPPLER COMPLETE: CPT

## 2025-01-16 PROCEDURE — 36415 COLL VENOUS BLD VENIPUNCTURE: CPT

## 2025-01-31 ENCOUNTER — HOSPITAL ENCOUNTER (OUTPATIENT)
Dept: LAB | Facility: MEDICAL CENTER | Age: 70
End: 2025-01-31
Attending: FAMILY MEDICINE
Payer: MEDICARE

## 2025-01-31 DIAGNOSIS — Z01.818 PREOP EXAMINATION: ICD-10-CM

## 2025-01-31 DIAGNOSIS — R89.9 ABNORMAL LABORATORY TEST RESULT: ICD-10-CM

## 2025-01-31 LAB
APTT PPP: 23.8 SEC (ref 24.7–36)
ERYTHROCYTE [DISTWIDTH] IN BLOOD BY AUTOMATED COUNT: 45.9 FL (ref 35.9–50)
HCT VFR BLD AUTO: 39.2 % (ref 37–47)
HGB BLD-MCNC: 13.4 G/DL (ref 12–16)
MCH RBC QN AUTO: 30.6 PG (ref 27–33)
MCHC RBC AUTO-ENTMCNC: 34.2 G/DL (ref 32.2–35.5)
MCV RBC AUTO: 89.5 FL (ref 81.4–97.8)
PLATELET # BLD AUTO: 204 K/UL (ref 164–446)
PMV BLD AUTO: 10.5 FL (ref 9–12.9)
RBC # BLD AUTO: 4.38 M/UL (ref 4.2–5.4)
WBC # BLD AUTO: 4.7 K/UL (ref 4.8–10.8)

## 2025-01-31 PROCEDURE — 85240 CLOT FACTOR VIII AHG 1 STAGE: CPT

## 2025-01-31 PROCEDURE — 36415 COLL VENOUS BLD VENIPUNCTURE: CPT

## 2025-01-31 PROCEDURE — 80503 PATH CLIN CONSLTJ SF 5-20: CPT | Mod: XU

## 2025-01-31 PROCEDURE — 85027 COMPLETE CBC AUTOMATED: CPT

## 2025-01-31 PROCEDURE — 85730 THROMBOPLASTIN TIME PARTIAL: CPT | Mod: GA

## 2025-02-03 PROCEDURE — 80503 PATH CLIN CONSLTJ SF 5-20: CPT | Mod: XU

## 2025-02-04 LAB
FACT VIII ACT/NOR PPP: 233 % (ref 45–145)
INHIBITOR INDICATED 1863: NO
PATH REV: NORMAL
PATH REV: NORMAL

## 2025-03-18 ENCOUNTER — TELEPHONE (OUTPATIENT)
Dept: HEMATOLOGY ONCOLOGY | Facility: MEDICAL CENTER | Age: 70
End: 2025-03-18
Payer: MEDICARE

## 2025-03-18 NOTE — TELEPHONE ENCOUNTER
Left message to have patient call [facility] at [phone] to reschedule 3/19/2025 because provider will be out of the office.

## 2025-03-31 ENCOUNTER — HOSPITAL ENCOUNTER (OUTPATIENT)
Dept: HEMATOLOGY ONCOLOGY | Facility: MEDICAL CENTER | Age: 70
End: 2025-03-31
Attending: INTERNAL MEDICINE
Payer: MEDICARE

## 2025-03-31 ENCOUNTER — HOSPITAL ENCOUNTER (OUTPATIENT)
Dept: LAB | Facility: MEDICAL CENTER | Age: 70
End: 2025-03-31
Attending: STUDENT IN AN ORGANIZED HEALTH CARE EDUCATION/TRAINING PROGRAM
Payer: MEDICARE

## 2025-03-31 VITALS
HEART RATE: 74 BPM | HEIGHT: 64 IN | BODY MASS INDEX: 19.91 KG/M2 | SYSTOLIC BLOOD PRESSURE: 100 MMHG | WEIGHT: 116.6 LBS | TEMPERATURE: 98.8 F | DIASTOLIC BLOOD PRESSURE: 64 MMHG | OXYGEN SATURATION: 97 %

## 2025-03-31 DIAGNOSIS — R79.1 ELEVATED FACTOR VIII LEVEL: ICD-10-CM

## 2025-03-31 LAB
CRP SERPL HS-MCNC: <0.3 MG/DL (ref 0–0.75)
ERYTHROCYTE [SEDIMENTATION RATE] IN BLOOD BY WESTERGREN METHOD: 6 MM/HOUR (ref 0–25)

## 2025-03-31 PROCEDURE — 85652 RBC SED RATE AUTOMATED: CPT

## 2025-03-31 PROCEDURE — 85245 CLOT FACTOR VIII VW RISTOCTN: CPT

## 2025-03-31 PROCEDURE — 86140 C-REACTIVE PROTEIN: CPT

## 2025-03-31 PROCEDURE — 85246 CLOT FACTOR VIII VW ANTIGEN: CPT | Mod: 91

## 2025-03-31 PROCEDURE — 85247 CLOT FACTOR VIII MULTIMETRIC: CPT

## 2025-03-31 PROCEDURE — 85240 CLOT FACTOR VIII AHG 1 STAGE: CPT | Mod: 91

## 2025-03-31 PROCEDURE — 36415 COLL VENOUS BLD VENIPUNCTURE: CPT

## 2025-03-31 PROCEDURE — 99212 OFFICE O/P EST SF 10 MIN: CPT | Performed by: STUDENT IN AN ORGANIZED HEALTH CARE EDUCATION/TRAINING PROGRAM

## 2025-03-31 PROCEDURE — 85610 PROTHROMBIN TIME: CPT

## 2025-03-31 PROCEDURE — 85240 CLOT FACTOR VIII AHG 1 STAGE: CPT

## 2025-03-31 PROCEDURE — 85730 THROMBOPLASTIN TIME PARTIAL: CPT

## 2025-03-31 ASSESSMENT — ENCOUNTER SYMPTOMS
SPUTUM PRODUCTION: 0
DEPRESSION: 0
SHORTNESS OF BREATH: 0
HEARTBURN: 0
BLURRED VISION: 0
CHILLS: 0
NAUSEA: 0
SENSORY CHANGE: 0
FOCAL WEAKNESS: 0
FEVER: 0
MEMORY LOSS: 0
SORE THROAT: 0
NECK PAIN: 0
TINGLING: 0
ORTHOPNEA: 0
TREMORS: 0
WEIGHT LOSS: 0
BRUISES/BLEEDS EASILY: 1
WHEEZING: 0
VOMITING: 0
ABDOMINAL PAIN: 0
HEADACHES: 0
DIZZINESS: 0
PALPITATIONS: 0
COUGH: 0

## 2025-03-31 ASSESSMENT — FIBROSIS 4 INDEX: FIB4 SCORE: 1.59

## 2025-03-31 NOTE — PROGRESS NOTES
Consult Note: Hematology/Oncology     Primary Care:  Mary Ellen Yousif M.D.    No chief complaint on file.      Current Treatment: None    Prior Treatment: None     Subjective:   History of Presenting Illness:  Telma Vergara is a 69 y.o. female who presents with elevated Factor VIII level.    Patient reports that she had breast implant surgery 6 weeks ago.  At this time she had a pre-op workup and was found to have an elevated PTT along with elevated Factor VIII level.    She does note that she bleeds easily.  She believes its 2/2 to aging.      She did start hormone treatment - she takes progesterone, testosterone and estradiol.,     She drinks 2-3 drinks/week.     She has never had a blood clot.    Has 2 adult children who has no issues with bleeding.    Past Medical History:   Diagnosis Date    Hypertension     Osteoporosis     Sleep apnea 01/01/2012    SOB (shortness of breath) on exertion 01/29/2021    If at higher elevations- 10,000 ft          Past Surgical History:   Procedure Laterality Date    EYE SURGERY  2016    eye lens implants    BUNIONECTOMY Right 2015    CHOLECYSTECTOMY  2013    LUMPECTOMY  2004,2014    breast implants and revision    [place] BREAST AUGMENTATION WITH IMPLANT      PRIMARY C SECTION      1989       Social History     Tobacco Use    Smoking status: Never    Smokeless tobacco: Never   Vaping Use    Vaping status: Never Used   Substance Use Topics    Alcohol use: Yes     Alcohol/week: 4.8 oz     Types: 5 Glasses of wine, 3 Standard drinks or equivalent per week     Comment: 5 glasses of wine a week    Drug use: Never        Family History   Problem Relation Age of Onset    Heart Disease Mother     Hypertension Mother     COPD Father     Other Father     Kidney Disease Father     Hyperlipidemia Father     Alcohol abuse Father        No Known Allergies    Current Outpatient Medications   Medication Sig Dispense Refill    progesterone (PROMETRIUM) 100 MG Cap Take 200 mg by mouth every day.       testosterone (TESTIM/ANDROGEL) 50 MG/5GM (1%) Gel gel Place 50 mg on the skin every day. Injection 5 mg taking x 2 week      estrogens, conjugated (PREMARIN) 0.3 MG Tab Take 0.3 mg by mouth every day. Injection 5 mg x 2  week      albuterol 108 (90 Base) MCG/ACT Aero Soln inhalation aerosol Inhale 2 Puffs every four hours as needed for Shortness of Breath. 1 Each 3    lisinopril (PRINIVIL) 10 MG Tab Take 1 Tablet by mouth every day. 90 Tablet 3    Cranberry-Vitamin C-Probiotic (AZO CRANBERRY PO) Take  by mouth.      Non Formulary Request Athletic Greens      Non Formulary Request Roma  Rescversultana Jennyfer      Non Formulary Request Roma Aguayo      Omega-3 Fatty Acids (FISH OIL) 1000 MG Cap capsule Take 1,000 mg by mouth 2 Times a Day. After meals       No current facility-administered medications for this visit.       Review of Systems   Constitutional:  Negative for chills, fever, malaise/fatigue and weight loss.   HENT:  Negative for congestion, ear pain, nosebleeds and sore throat.    Eyes:  Negative for blurred vision.   Respiratory:  Negative for cough, sputum production, shortness of breath and wheezing.    Cardiovascular:  Negative for chest pain, palpitations, orthopnea and leg swelling.   Gastrointestinal:  Negative for abdominal pain, heartburn, nausea and vomiting.   Genitourinary:  Negative for dysuria, frequency and urgency.   Musculoskeletal:  Negative for neck pain.   Neurological:  Negative for dizziness, tingling, tremors, sensory change, focal weakness and headaches.   Endo/Heme/Allergies:  Bruises/bleeds easily.   Psychiatric/Behavioral:  Negative for depression, memory loss and suicidal ideas.    All other systems reviewed and are negative.      Problem list, medications, and allergies reviewed by myself today in Epic.     Objective:   There were no vitals filed for this visit.    DESC; KARNOFSKY SCALE WITH ECOG EQUIVALENT: 90, Able to carry on normal activity; minor signs or symptoms of  disease (ECOG equivalent 0)    DISTRESS LEVEL: no apparent distress    Physical Exam  Constitutional:       General: She is not in acute distress.     Appearance: Normal appearance. She is not ill-appearing.   HENT:      Head: Normocephalic and atraumatic.      Nose: Nose normal.      Mouth/Throat:      Mouth: Mucous membranes are moist.      Pharynx: No oropharyngeal exudate or posterior oropharyngeal erythema.   Eyes:      General: No scleral icterus.     Conjunctiva/sclera: Conjunctivae normal.      Pupils: Pupils are equal, round, and reactive to light.   Cardiovascular:      Rate and Rhythm: Normal rate and regular rhythm.      Pulses: Normal pulses.      Heart sounds: Normal heart sounds. No murmur heard.     No friction rub. No gallop.   Pulmonary:      Effort: Pulmonary effort is normal. No respiratory distress.      Breath sounds: Normal breath sounds. No stridor. No wheezing, rhonchi or rales.   Chest:      Chest wall: No tenderness.   Abdominal:      General: Abdomen is flat. Bowel sounds are normal. There is no distension.      Palpations: Abdomen is soft. There is no mass.      Tenderness: There is no abdominal tenderness. There is no guarding.   Musculoskeletal:         General: No swelling, tenderness or deformity. Normal range of motion.      Cervical back: Normal range of motion and neck supple. No rigidity or tenderness.      Right lower leg: No edema.      Left lower leg: No edema.   Skin:     General: Skin is warm and dry.      Coloration: Skin is not jaundiced or pale.      Findings: No bruising, erythema or rash.   Neurological:      General: No focal deficit present.      Mental Status: She is alert and oriented to person, place, and time. Mental status is at baseline.      Sensory: No sensory deficit.      Motor: No weakness.      Coordination: Coordination normal.      Gait: Gait normal.   Psychiatric:         Mood and Affect: Mood normal.         Behavior: Behavior normal.         Thought  Content: Thought content normal.         Judgment: Judgment normal.         Labs:   Most recent labs reviewed.  Please see the lab tab of chart review    Imaging:   Most recent images below have been independently reviewed by me.  Please see the imaging tab of chart review      Assessment/Plan:         Ms. Vergara is a 69 is a 70 yo F who has an elevated Factor VIII.    Today I had a long discussion with the patient regarding her elevated FVIII level.    I explained to her that there are several reasons why one might have an elevated FVIII level.  These include   1) Liver disease (however I don’t see evidence of liver disease on CMP and no evidence in the medical chart to indicate that there is a history of liver disease).   2) Acute phase reactant - since FVIII is an APR, it can be elevated in chronic inflammation, infection or times of stress  3) Blood clot - can be elevated and make someone ore susceptible to blood clots. Genetic mutations (ex: Factor VIII Padua), have been associated with familial thrombophilia.  These present with high FVIII levels. No personal or family history of blood clots.  4) Malignancy -cancer can elevate FVIII levels.  5) Obesity - having a higher BMI is associated with high FVIII levels.  6) Hormone use - use of estrogens can cause elevated FVIII which the patient just started   7)  Von Willebrand Disease - she is endorsing bruising    Plan  -repeat FVIII level  -ESR, CRP  -updated CMP to assess liver function  -updated cancer screening  -if factor VIII  -RUQ US  - genetic testing for mutations such as the partial duplication of the F8 gene (Factor VIII Padua)    Virtual in 7-10 days     No follow-ups on file.     Any questions and concerns raised by the patient were addressed and answered. Patient denies any further questions.  Patient encouraged to call the office with any concerns or issues.     Sharon Sosa M.D.  Hematology/Oncology

## 2025-04-01 LAB
APTT PPP: 22.9 SEC (ref 24.7–36)
FACT VIII ACT/NOR PPP: 262 % (ref 45–145)
INHIBITOR INDICATED 1863: NO
INR PPP: 0.94 (ref 0.87–1.13)
PROTHROMBIN TIME: 12.5 SEC (ref 12–14.6)

## 2025-04-04 LAB
FACT VIII ACT/NOR PPP: 255 % (ref 56–191)
VWF AG ACT/NOR PPP IA: 199 % (ref 52–214)
VWF:RCO ACT/NOR PPP PL AGG: 222 % (ref 51–215)

## 2025-04-08 LAB
FACT VIII ACT/NOR PPP: 235 % (ref 56–191)
VWF AG ACT/NOR PPP IA: 204 % (ref 52–214)
VWF MULTIMERS PPP QL: ABNORMAL
VWF:RCO ACT/NOR PPP PL AGG: 241 % (ref 51–215)

## 2025-04-11 ASSESSMENT — ENCOUNTER SYMPTOMS
BRUISES/BLEEDS EASILY: 1
DIZZINESS: 0
NECK PAIN: 0
ORTHOPNEA: 0
TINGLING: 0
CHILLS: 0
NAUSEA: 0
PALPITATIONS: 0
ABDOMINAL PAIN: 0
FEVER: 0
WEIGHT LOSS: 0
HEADACHES: 0
FOCAL WEAKNESS: 0
HEARTBURN: 0
SPUTUM PRODUCTION: 0
BLURRED VISION: 0
SENSORY CHANGE: 0
TREMORS: 0
MEMORY LOSS: 0
COUGH: 0
SHORTNESS OF BREATH: 0
SORE THROAT: 0
DEPRESSION: 0
WHEEZING: 0
VOMITING: 0

## 2025-04-12 NOTE — PROGRESS NOTES
Consult Note: Hematology/Oncology     Primary Care:  Mary Ellen Yousif M.D.    No chief complaint on file.      Current Treatment: None    Prior Treatment: None     Subjective:   History of Presenting Illness:  Telma Vergara is a 69 y.o. female who presents with elevated Factor VIII level.    Patient reports that she had breast implant surgery 6 weeks ago.  At this time she had a pre-op workup and was found to have an elevated PTT along with elevated Factor VIII level.    She does note that she bleeds easily.  She believes its 2/2 to aging.      She did start hormone treatment - she takes progesterone, testosterone and estradiol.,     She drinks 2-3 drinks/week.     She has never had a blood clot.    Has 2 adult children who has no issues with bleeding.    Interval History  Patient presents to review labs and discuss next steps.     Past Medical History:   Diagnosis Date    Hypertension     Osteoporosis     Sleep apnea 01/01/2012    SOB (shortness of breath) on exertion 01/29/2021    If at higher elevations- 10,000 ft          Past Surgical History:   Procedure Laterality Date    EYE SURGERY  2016    eye lens implants    BUNIONECTOMY Right 2015    CHOLECYSTECTOMY  2013    LUMPECTOMY  2004,2014    breast implants and revision    [place] BREAST AUGMENTATION WITH IMPLANT      PRIMARY C SECTION      1989       Social History     Tobacco Use    Smoking status: Never    Smokeless tobacco: Never   Vaping Use    Vaping status: Never Used   Substance Use Topics    Alcohol use: Yes     Alcohol/week: 4.8 oz     Types: 5 Glasses of wine, 3 Standard drinks or equivalent per week     Comment: 5 glasses of wine a week    Drug use: Never        Family History   Problem Relation Age of Onset    Heart Disease Mother     Hypertension Mother     COPD Father     Other Father     Kidney Disease Father     Hyperlipidemia Father     Alcohol abuse Father        No Known Allergies    Current Outpatient Medications   Medication Sig Dispense  Refill    progesterone (PROMETRIUM) 100 MG Cap Take 200 mg by mouth every day.      testosterone (TESTIM/ANDROGEL) 50 MG/5GM (1%) Gel gel Place 50 mg on the skin every day. Injection 5 mg taking x 2 week      estrogens, conjugated (PREMARIN) 0.3 MG Tab Take 0.3 mg by mouth every day. Injection 5 mg x 2  week      albuterol 108 (90 Base) MCG/ACT Aero Soln inhalation aerosol Inhale 2 Puffs every four hours as needed for Shortness of Breath. 1 Each 3    lisinopril (PRINIVIL) 10 MG Tab Take 1 Tablet by mouth every day. 90 Tablet 3    Cranberry-Vitamin C-Probiotic (AZO CRANBERRY PO) Take  by mouth.      Non Formulary Request Athletic Greens      Non Formulary Request Roma  [place] Jennyfer      Non Formulary Request Roma Aguayo      Omega-3 Fatty Acids (FISH OIL) 1000 MG Cap capsule Take 1,000 mg by mouth 2 Times a Day. After meals       No current facility-administered medications for this visit.       Review of Systems   Constitutional:  Negative for chills, fever, malaise/fatigue and weight loss.   HENT:  Negative for congestion, ear pain, nosebleeds and sore throat.    Eyes:  Negative for blurred vision.   Respiratory:  Negative for cough, sputum production, shortness of breath and wheezing.    Cardiovascular:  Negative for chest pain, palpitations, orthopnea and leg swelling.   Gastrointestinal:  Negative for abdominal pain, heartburn, nausea and vomiting.   Genitourinary:  Negative for dysuria, frequency and urgency.   Musculoskeletal:  Negative for neck pain.   Neurological:  Negative for dizziness, tingling, tremors, sensory change, focal weakness and headaches.   Endo/Heme/Allergies:  Bruises/bleeds easily.   Psychiatric/Behavioral:  Negative for depression, memory loss and suicidal ideas.    All other systems reviewed and are negative.      Problem list, medications, and allergies reviewed by myself today in Epic.     Objective:   There were no vitals filed for this visit.    DESC; KARNOFSKY SCALE WITH  ECOG EQUIVALENT: 90, Able to carry on normal activity; minor signs or symptoms of disease (ECOG equivalent 0)    DISTRESS LEVEL: no apparent distress  Not done due virtual appointment  Physical Exam  Constitutional:       General: She is not in acute distress.     Appearance: Normal appearance. She is not ill-appearing.   HENT:      Head: Normocephalic and atraumatic.      Nose: Nose normal.      Mouth/Throat:      Mouth: Mucous membranes are moist.      Pharynx: No oropharyngeal exudate or posterior oropharyngeal erythema.   Eyes:      General: No scleral icterus.     Conjunctiva/sclera: Conjunctivae normal.      Pupils: Pupils are equal, round, and reactive to light.   Cardiovascular:      Rate and Rhythm: Normal rate and regular rhythm.      Pulses: Normal pulses.      Heart sounds: Normal heart sounds. No murmur heard.     No friction rub. No gallop.   Pulmonary:      Effort: Pulmonary effort is normal. No respiratory distress.      Breath sounds: Normal breath sounds. No stridor. No wheezing, rhonchi or rales.   Chest:      Chest wall: No tenderness.   Abdominal:      General: Abdomen is flat. Bowel sounds are normal. There is no distension.      Palpations: Abdomen is soft. There is no mass.      Tenderness: There is no abdominal tenderness. There is no guarding.   Musculoskeletal:         General: No swelling, tenderness or deformity. Normal range of motion.      Cervical back: Normal range of motion and neck supple. No rigidity or tenderness.      Right lower leg: No edema.      Left lower leg: No edema.   Skin:     General: Skin is warm and dry.      Coloration: Skin is not jaundiced or pale.      Findings: No bruising, erythema or rash.   Neurological:      General: No focal deficit present.      Mental Status: She is alert and oriented to person, place, and time. Mental status is at baseline.      Sensory: No sensory deficit.      Motor: No weakness.      Coordination: Coordination normal.      Gait: Gait  normal.   Psychiatric:         Mood and Affect: Mood normal.         Behavior: Behavior normal.         Thought Content: Thought content normal.         Judgment: Judgment normal.         Labs:   Most recent labs reviewed.  Please see the lab tab of chart review    Imaging:   Most recent images below have been independently reviewed by me.  Please see the imaging tab of chart review      Assessment/Plan:         Ms. Vergara is a 69 is a 68 yo F who has an elevated Factor VIII.    Repeat FVIII remains elevated.    VWF Ag 204 (normal), VWF activity 241 (elevated). Typically VWF is associated with reduced levels.  Thus patient does not have VWF.    Factor VIII may be an acute phase reactant - since FVIII is an APR, it can be elevated in chronic inflammation, infection or times of stress.     I also reviewed her hormone levels ordered by another physician (hormone replacement therapy group). We discussed that her testosterone and progresterone may be the cause of her increased Factor VIII.    I counseled the patient that Factor VIII levels are linked to hypercoaguability.    We did discuss that genetic mutations (ex: Factor VIII Padua), have been associated with familial thrombophilia.  These present with high FVIII levels.    ESR CRP normal    INR PTT relatively normal (although aPTT is low).    Plan  1) will recommend half of the dose of testosterone, check factor VIII in 7/2025 ([place]  2) if still elevated without much improvement will reduce again, and consider progesterone re dosing.  3) precautions to proceed to the ER if concerns for blood clot  4) genetic testing for mutations such as the partial duplication of the F8 gene (Factor VIII Padua)  -if normal/negative, likely 2/2 to Acute phase reactant    2.  HTN  On lisinopril  Continue    3.  Asthma  -continue albuterol      Any questions and concerns raised by the patient were addressed and answered. Patient denies any  further questions.  Patient encouraged to call the office with any concerns or issues.     Sharon Sosa M.D.  Hematology/Oncology    This evaluation was conducted via Teams using secure and encrypted videoconferencing technology. The patient was in their home in the [facility].    The patient's identity was confirmed and verbal consent was obtained for this virtual visit.

## 2025-04-14 ENCOUNTER — HOSPITAL ENCOUNTER (OUTPATIENT)
Dept: HEMATOLOGY ONCOLOGY | Facility: MEDICAL CENTER | Age: 70
End: 2025-04-14
Attending: STUDENT IN AN ORGANIZED HEALTH CARE EDUCATION/TRAINING PROGRAM
Payer: MEDICARE

## 2025-04-14 VITALS — BODY MASS INDEX: 20 KG/M2 | HEIGHT: 64 IN

## 2025-04-14 DIAGNOSIS — I10 ESSENTIAL HYPERTENSION: ICD-10-CM

## 2025-04-14 DIAGNOSIS — D68.59 HYPERCOAGULABLE STATE (HCC): ICD-10-CM

## 2025-04-14 DIAGNOSIS — R79.1 ELEVATED FACTOR VIII LEVEL: ICD-10-CM

## 2025-04-14 PROCEDURE — 99214 OFFICE O/P EST MOD 30 MIN: CPT | Mod: 95 | Performed by: STUDENT IN AN ORGANIZED HEALTH CARE EDUCATION/TRAINING PROGRAM

## 2025-04-14 PROCEDURE — 99212 OFFICE O/P EST SF 10 MIN: CPT | Performed by: STUDENT IN AN ORGANIZED HEALTH CARE EDUCATION/TRAINING PROGRAM

## 2025-04-14 NOTE — LETTER
[facility]  Mary Ellen Yousif M.D.  [address] [place] [address]  Fax: [phone]   Authorization for Release/Disclosure of   Protected Health Information   Name: SLAVA VERGARA : 1955 SSN: [id number]   Address: [facility]  [address] [place] [address] [place] [address] Phone:    There are no phone numbers on file.   I authorize the entity listed below to release/disclose the PHI below to:   [facility]/Mary Ellen Yousif M.D. and Sharon Sosa M.D.   Provider or Entity Name:  {[facility] COLORECTAL SCREENING LOCATIONS:[id number]}   Reason for request: continuity of care   Information to be released:    [ X ] LAST COLONOSCOPY,  including any PATH REPORT and follow-up  [ X ] LAST FIT/COLOGUARD RESULT [  ] LAST DEXA  [  ] LAST MAMMOGRAM  [  ] LAST PAP  [  ] LAST LABS [  ] RETINA EXAM REPORT  [  ] IMMUNIZATION RECORDS  [  ] Release all info      [  ] Check here and initial the line next to each item to release ALL health information INCLUDING  _____ Care and treatment for drug and / or alcohol abuse  _____ HIV testing, infection status, or AIDS  _____ Genetic Testing    DATES OF SERVICE OR TIME PERIOD TO BE DISCLOSED: _____________  I understand and acknowledge that:  * This Authorization may be revoked at any time by you in writing, except if your health information has already been used or disclosed.  * Your health information that will be used or disclosed as a result of you signing this authorization could be re-disclosed by the recipient. If this occurs, your re-disclosed health information may no longer be protected by State or Federal laws.  * You may refuse to sign this Authorization. Your refusal will not affect your ability to obtain treatment.  * This Authorization becomes effective upon signing and will  on (date) __________.      If no date is indicated, this Authorization will  one (1) year from the signature date.    Name: Slava Vergara    Signature:   Date:     2025        PLEASE FAX REQUESTED RECORDS BACK TO: [phone]

## 2025-04-14 NOTE — ADDENDUM NOTE
Encounter addended by: Huy Lambert, Med Ass't on: 4/14/2025 9:07 AM   Actions taken: Charge Capture section accepted

## 2025-04-14 NOTE — ADDENDUM NOTE
Encounter addended by: Huy Lambert, Med Ass't on: 4/14/2025 8:48 AM   Actions taken: Charge Capture section accepted

## 2025-07-23 ENCOUNTER — APPOINTMENT (OUTPATIENT)
Facility: MEDICAL CENTER | Age: 70
End: 2025-07-23
Payer: MEDICARE

## 2025-07-24 ENCOUNTER — HOSPITAL ENCOUNTER (OUTPATIENT)
Dept: LAB | Facility: MEDICAL CENTER | Age: 70
End: 2025-07-24
Attending: STUDENT IN AN ORGANIZED HEALTH CARE EDUCATION/TRAINING PROGRAM
Payer: MEDICARE

## 2025-07-24 DIAGNOSIS — D68.59 HYPERCOAGULABLE STATE (HCC): ICD-10-CM

## 2025-07-24 PROCEDURE — 36415 COLL VENOUS BLD VENIPUNCTURE: CPT

## 2025-07-24 PROCEDURE — 85240 CLOT FACTOR VIII AHG 1 STAGE: CPT

## 2025-07-25 LAB
FACT VIII ACT/NOR PPP: 276 % (ref 45–145)
INHIBITOR INDICATED 1863: NO

## 2025-07-31 ENCOUNTER — HOSPITAL ENCOUNTER (OUTPATIENT)
Dept: HEMATOLOGY ONCOLOGY | Facility: MEDICAL CENTER | Age: 70
End: 2025-07-31
Attending: STUDENT IN AN ORGANIZED HEALTH CARE EDUCATION/TRAINING PROGRAM
Payer: MEDICARE

## 2025-07-31 DIAGNOSIS — R79.1 ELEVATED FACTOR VIII LEVEL: Primary | ICD-10-CM

## 2025-07-31 ASSESSMENT — ENCOUNTER SYMPTOMS
HEARTBURN: 0
FEVER: 0
NAUSEA: 0
WHEEZING: 0
TINGLING: 0
DEPRESSION: 0
WEIGHT LOSS: 0
SPUTUM PRODUCTION: 0
BRUISES/BLEEDS EASILY: 1
BLURRED VISION: 0
PALPITATIONS: 0
VOMITING: 0
FOCAL WEAKNESS: 0
COUGH: 0
ABDOMINAL PAIN: 0
DIZZINESS: 0
TREMORS: 0
ORTHOPNEA: 0
SENSORY CHANGE: 0
SORE THROAT: 0
HEADACHES: 0
MEMORY LOSS: 0
SHORTNESS OF BREATH: 0
NECK PAIN: 0
CHILLS: 0

## 2025-07-31 NOTE — PROGRESS NOTES
Consult Note: Hematology/Oncology     Primary Care:  Mary Ellen Yousif M.D.    Chief Complaint   Patient presents with    Follow-Up     Elevated Factor VIII Level        Current Treatment: None    Prior Treatment: None     Subjective:Always abdomen: Hey there is no yeah I was actually just thinking that at some point like this for months from his bowels you today to start to do it and you are doing more like from [phone] so the   History of Presenting Illness:  Telma Vergara is a 69 y.o. female who presents with elevated Factor VIII level.    Patient reports that she had breast implant surgery 6 weeks ago.  At this time she had a pre-op workup and was found to have an elevated PTT along with elevated Factor VIII level.    She does note that she bleeds easily.  She believes its 2/2 to aging.      She did start hormone treatment - she takes progesterone, testosterone and estradiol.,     She drinks 2-3 drinks/week.     She has never had a blood clot.    Has 2 adult children who has no issues with bleeding.    Interval History  Patient presents to review labs and discuss next steps.   She recently went to [facility] and no issues with blood clots.  With regard to the hormones, she did reduce the dose of her testesteorne    Past Medical History:   Diagnosis Date    Hypertension     Osteoporosis     Sleep apnea 01/01/2012    SOB (shortness of breath) on exertion 01/29/2021    If at higher elevations- 10,000 ft          Past Surgical History:   Procedure Laterality Date    EYE SURGERY  2016    eye lens implants    BUNIONECTOMY Right 2015    CHOLECYSTECTOMY  2013    LUMPECTOMY  2004,2014    breast implants and revision    IN BREAST AUGMENTATION WITH IMPLANT      PRIMARY C SECTION      1989       Social History     Tobacco Use    Smoking status: Never    Smokeless tobacco: Never   Vaping Use    Vaping status: Never Used   Substance Use Topics    Alcohol use: Yes     Alcohol/week: 4.8 oz     Types: 5 Glasses of wine, 3  Standard drinks or equivalent per week     Comment: 5 glasses of wine a week    Drug use: Never        Family History   Problem Relation Age of Onset    Heart Disease Mother     Hypertension Mother     COPD Father     Other Father     Kidney Disease Father     Hyperlipidemia Father     Alcohol abuse Father        No Known Allergies    Current Outpatient Medications   Medication Sig Dispense Refill    lisinopril (PRINIVIL) 10 MG Tab Take 1 Tablet by mouth every day. 90 Tablet 3    progesterone (PROMETRIUM) 100 MG Cap Take 200 mg by mouth every day.      testosterone (TESTIM/ANDROGEL) 50 MG/5GM (1%) Gel gel Place 50 mg on the skin every day. Injection 5 mg taking x 2 week      estrogens, conjugated (PREMARIN) 0.3 MG Tab Take 0.3 mg by mouth every day. Injection 5 mg x 2  week      Non Formulary Request Athletic Greens      Non Formulary Request Roma Aguayo      Omega-3 Fatty Acids (FISH OIL) 1000 MG Cap capsule Take 1,000 mg by mouth 2 Times a Day. After meals      albuterol 108 (90 Base) MCG/ACT Aero Soln inhalation aerosol Inhale 2 Puffs every four hours as needed for Shortness of Breath. 1 Each 3    Cranberry-Vitamin C-Probiotic (AZO CRANBERRY PO) Take  by mouth.      Non Formulary Request Roma Burger       No current facility-administered medications for this encounter.       Review of Systems   Constitutional:  Negative for chills, fever, malaise/fatigue and weight loss.   HENT:  Negative for congestion, ear pain, nosebleeds and sore throat.    Eyes:  Negative for blurred vision.   Respiratory:  Negative for cough, sputum production, shortness of breath and wheezing.    Cardiovascular:  Negative for chest pain, palpitations, orthopnea and leg swelling.   Gastrointestinal:  Negative for abdominal pain, heartburn, nausea and vomiting.   Genitourinary:  Negative for dysuria, frequency and urgency.   Musculoskeletal:  Negative for neck pain.   Neurological:  Negative for dizziness, tingling, tremors,  sensory change, focal weakness and headaches.   Endo/Heme/Allergies:  Bruises/bleeds easily.   Psychiatric/Behavioral:  Negative for depression, memory loss and suicidal ideas.    All other systems reviewed and are negative.      Problem list, medications, and allergies reviewed by myself today in Epic.     Objective:   There were no vitals filed for this visit.    DESC; KARNOFSKY SCALE WITH ECOG EQUIVALENT: 90, Able to carry on normal activity; minor signs or symptoms of disease (ECOG equivalent 0)    DISTRESS LEVEL: no apparent distress  Not done due virtual appointment  Physical Exam  Constitutional:       General: She is not in acute distress.     Appearance: Normal appearance. She is not ill-appearing.   HENT:      Head: Normocephalic and atraumatic.      Nose: Nose normal.      Mouth/Throat:      Mouth: Mucous membranes are moist.      Pharynx: No oropharyngeal exudate or posterior oropharyngeal erythema.   Eyes:      General: No scleral icterus.     Conjunctiva/sclera: Conjunctivae normal.      Pupils: Pupils are equal, round, and reactive to light.   Cardiovascular:      Rate and Rhythm: Normal rate and regular rhythm.      Pulses: Normal pulses.      Heart sounds: Normal heart sounds. No murmur heard.     No friction rub. No gallop.   Pulmonary:      Effort: Pulmonary effort is normal. No respiratory distress.      Breath sounds: Normal breath sounds. No stridor. No wheezing, rhonchi or rales.   Chest:      Chest wall: No tenderness.   Abdominal:      General: Abdomen is flat. Bowel sounds are normal. There is no distension.      Palpations: Abdomen is soft. There is no mass.      Tenderness: There is no abdominal tenderness. There is no guarding.   Musculoskeletal:         General: No swelling, tenderness or deformity. Normal range of motion.      Cervical back: Normal range of motion and neck supple. No rigidity or tenderness.      Right lower leg: No edema.      Left lower leg: No edema.   Skin:      General: Skin is warm and dry.      Coloration: Skin is not jaundiced or pale.      Findings: No bruising, erythema or rash.   Neurological:      General: No focal deficit present.      Mental Status: She is alert and oriented to person, place, and time. Mental status is at baseline.      Sensory: No sensory deficit.      Motor: No weakness.      Coordination: Coordination normal.      Gait: Gait normal.   Psychiatric:         Mood and Affect: Mood normal.         Behavior: Behavior normal.         Thought Content: Thought content normal.         Judgment: Judgment normal.         Labs:   Most recent labs reviewed.  Please see the lab tab of chart review    Imaging:   Most recent images below have been independently reviewed by me.  Please see the imaging tab of chart review      Assessment/Plan:         Ms. Vergara is a 69 is a 70 yo F who has an elevated Factor VIII.    Repeat FVIII remains elevated.    VWF Ag 204 (normal), VWF activity 241 (elevated). Typically VWF is associated with reduced levels.  Thus patient does not have VWF.    Factor VIII may be an acute phase reactant - since FVIII is an APR, it can be elevated in chronic inflammation, infection or times of stress.     I also reviewed her hormone levels ordered by another physician (hormone replacement therapy group). We discussed that her testosterone and progresterone may be the cause of her increased Factor VIII.    I counseled the patient that Factor VIII levels can be linked to hypercoaguability.    We did discuss that genetic mutations (ex: Factor VIII Padua), have been associated with familial thrombophilia.  These present with high FVIII levels.    We discussed that she will like to continue to reduce her hormones potentially the progesterone next or get genetic testing for Factor VIII Paduav.  At this current time she is not interested as this would not change her current lifestyle and she reported that she will not be taking any medication  regardless of her diagnosis.  In this situation I agree.  Again the other possibility is that this could be an acute phase reactant    INR PTT relatively normal (although aPTT is low).    Plan  Patient does not wish to undergo further genetic testing  Patient will contact us in the future if needed    2.  HTN  On lisinopril  Continue    3.  Asthma  -continue albuterol    Any questions and concerns raised by the patient were addressed and answered. Patient denies any further questions.  Patient encouraged to call the office with any concerns or issues.     Sharon Sosa M.D.  Hematology/Oncology    This evaluation was conducted via Teams using secure and encrypted videoconferencing technology. The patient was in their home in the [facility].    The patient's identity was confirmed and verbal consent was obtained for this virtual visit.     31 minutes was spent on this case, through research, documentation and during the visit

## 2025-09-10 ENCOUNTER — APPOINTMENT (OUTPATIENT)
Dept: MEDICAL GROUP | Facility: IMAGING CENTER | Age: 70
End: 2025-09-10
Payer: MEDICARE